# Patient Record
Sex: MALE | Race: WHITE | NOT HISPANIC OR LATINO | URBAN - METROPOLITAN AREA
[De-identification: names, ages, dates, MRNs, and addresses within clinical notes are randomized per-mention and may not be internally consistent; named-entity substitution may affect disease eponyms.]

---

## 2017-11-05 ENCOUNTER — OUTPATIENT (OUTPATIENT)
Dept: OUTPATIENT SERVICES | Facility: HOSPITAL | Age: 47
LOS: 1 days | Discharge: HOME | End: 2017-11-05

## 2017-11-06 DIAGNOSIS — G47.33 OBSTRUCTIVE SLEEP APNEA (ADULT) (PEDIATRIC): ICD-10-CM

## 2019-10-11 ENCOUNTER — APPOINTMENT (OUTPATIENT)
Dept: OTOLARYNGOLOGY | Facility: CLINIC | Age: 49
End: 2019-10-11
Payer: COMMERCIAL

## 2019-10-11 VITALS
WEIGHT: 300 LBS | SYSTOLIC BLOOD PRESSURE: 133 MMHG | BODY MASS INDEX: 38.5 KG/M2 | HEART RATE: 77 BPM | DIASTOLIC BLOOD PRESSURE: 84 MMHG | HEIGHT: 74 IN

## 2019-10-11 DIAGNOSIS — Z87.39 PERSONAL HISTORY OF OTHER DISEASES OF THE MUSCULOSKELETAL SYSTEM AND CONNECTIVE TISSUE: ICD-10-CM

## 2019-10-11 DIAGNOSIS — Z86.79 PERSONAL HISTORY OF OTHER DISEASES OF THE CIRCULATORY SYSTEM: ICD-10-CM

## 2019-10-11 DIAGNOSIS — E78.2 MIXED HYPERLIPIDEMIA: ICD-10-CM

## 2019-10-11 DIAGNOSIS — E11.9 TYPE 2 DIABETES MELLITUS W/OUT COMPLICATIONS: ICD-10-CM

## 2019-10-11 DIAGNOSIS — Z87.81 PERSONAL HISTORY OF (HEALED) TRAUMATIC FRACTURE: ICD-10-CM

## 2019-10-11 DIAGNOSIS — K57.30 DIVERTICULOSIS OF LARGE INTESTINE W/OUT PERFORATION OR ABSCESS W/OUT BLEEDING: ICD-10-CM

## 2019-10-11 PROBLEM — Z00.00 ENCOUNTER FOR PREVENTIVE HEALTH EXAMINATION: Status: ACTIVE | Noted: 2019-10-11

## 2019-10-11 PROCEDURE — 99244 OFF/OP CNSLTJ NEW/EST MOD 40: CPT | Mod: 25

## 2019-10-11 PROCEDURE — 31231 NASAL ENDOSCOPY DX: CPT

## 2019-10-11 RX ORDER — MOMETASONE 50 UG/1
50 SPRAY, METERED NASAL DAILY
Qty: 1 | Refills: 5 | Status: ACTIVE | COMMUNITY
Start: 2019-10-11 | End: 1900-01-01

## 2019-10-11 RX ORDER — ALLOPURINOL 200 MG/1
TABLET ORAL
Refills: 0 | Status: ACTIVE | COMMUNITY

## 2019-10-11 RX ORDER — ATORVASTATIN CALCIUM 80 MG/1
TABLET, FILM COATED ORAL
Refills: 0 | Status: ACTIVE | COMMUNITY

## 2019-10-11 RX ORDER — FENOFIBRATE 134 MG/1
134 CAPSULE ORAL
Refills: 0 | Status: ACTIVE | COMMUNITY

## 2019-10-11 RX ORDER — VALSARTAN 40 MG/1
TABLET, COATED ORAL
Refills: 0 | Status: ACTIVE | COMMUNITY

## 2019-10-11 RX ORDER — SAXAGLIPTIN 5 MG/1
TABLET, FILM COATED ORAL
Refills: 0 | Status: ACTIVE | COMMUNITY

## 2019-10-11 RX ORDER — ASPIRIN 81 MG
81 TABLET, DELAYED RELEASE (ENTERIC COATED) ORAL
Refills: 0 | Status: ACTIVE | COMMUNITY

## 2019-10-11 RX ORDER — PIOGLITAZONE 30 MG/1
30 TABLET ORAL
Refills: 0 | Status: ACTIVE | COMMUNITY

## 2019-10-11 RX ORDER — MULTIVITAMIN
TABLET ORAL
Refills: 0 | Status: ACTIVE | COMMUNITY

## 2019-10-11 NOTE — CONSULT LETTER
[Dear  ___] : Dear  [unfilled], [Consult Letter:] : I had the pleasure of evaluating your patient, [unfilled]. [Sincerely,] : Sincerely, [Please see my note below.] : Please see my note below. [Consult Closing:] : Thank you very much for allowing me to participate in the care of this patient.  If you have any questions, please do not hesitate to contact me. [FreeTextEntry3] : Issa Bond MD, ZEINAB, FACS\par  Department Otolaryngology\par Director of [facility]\par Professor of Otolaryngology, \par [facility]\par

## 2019-10-11 NOTE — PROCEDURE
[Image(s) Captured] : image(s) captured and filed [Topical Lidocaine] : topical lidocaine [Oxymetazoline HCl] : oxymetazoline HCl [Flexible Endoscope] : examined with the flexible endoscope [Serial Number: ___] : Serial Number: [unfilled] [Anterior rhinoscopy insufficient to account for symptoms] : anterior rhinoscopy insufficient to account for symptoms [Anatomical Abnormality] : anatomical abnormality [Recalcitrant Symptoms] : recalcitrant symptoms  [FreeTextEntry6] : Pre-op indication(s): JAMES, Nasal obstruction\par Post-op indication(s): Re deviated septum\par Verbal consent obtained from patient.\par “Anterior rhinoscopy insufficient to account for symptoms” \par Details for procedure: \par Scope #: [id number]\par Type of scope:    flexible fiber optic telescope   X  Rigid glass telescope \par Anesthesia and/or vasoconstriction was achieved topically by using: \par 4% Lidocaine spray   0.05% Oxymetazoline     Other ______ \par The following anatomic sites were directly examined in a sequential fashion: \par The scope was introduced in the nasal passage between the middle and inferior turbinates to exam the inferior portion of the middle meatus and the fontanelle, as well as the maxillary ostia. Next, the scope was passed medially and posteriorly to the middle turbinates to examine the sphenoethmoid recess and the superior turbinate region. \par Upon visualization the finders are as follows: \par Nasal Septum:      Deviated to      right    \par Bleeding site cauterized:    Anterior   left   right   Posterior   left   right \par Method:   Silver Nitrate   YAG Laser    Electrocautery ______ \par Right Side: \par * Mucosa: Normal\par * Mucous: Normal\par * Polyp: Normal\par * Inferior Turbinate: Normal\par * Middle Turbinate: Normal\par * Superior Turbinate: Normal\par * Inferior Meatus: Normal\par * Middle Meatus: Normal\par * Super Meatus: Normal\par * Sphenoethmoidal Recess: Normal\par Left Side: \par * Mucosa: Normal\par * Mucous: Normal\par * Polyp: Normal\par * Inferior Turbinate: Comp. Hypertrophy\par * Middle Turbinate: Normal\par * Superior Turbinate: Normal\par * Inferior Meatus: Normal\par * Middle Meatus: Normal\par * Super Meatus: Normal\par * Sphenoethmoidal Recess: Normal\par The patient tolerated the procedure well without any complications.\par \par \par  [de-identified] : JAMES

## 2019-10-11 NOTE — HISTORY OF PRESENT ILLNESS
[SMR] : submucous resection (SMR) [de-identified] : 49 year old male referred by Dr. Arango, for nasal congestion, difficulty breathing through the nose.  States left side of nose is more difficult to breath.  Denies sinus pain, sinus pressure, post nasal drip, nasal discharge. States difficult to use CPAP at night for obstructive sleep apnea due to breathing difficulties.  Denies sinus infections in the past year.  Denies use of steroidal nasal sprays. Pt just switched to full mask. Helps a little but still can not use long term. Keeps waking up.Patient had septum surgery in the 1980's .

## 2019-10-11 NOTE — REASON FOR VISIT
[Initial Consultation] : an initial consultation for [Other: _____] : [unfilled] [FreeTextEntry2] : referred by Dr. Arango, for nasal congestion, difficulty breathing through the nose

## 2019-10-11 NOTE — REVIEW OF SYSTEMS
[Nasal Congestion] : nasal congestion [Problem Snoring] : problem snoring [Negative] : Heme/Lymph [FreeTextEntry1] : fatigue

## 2019-11-27 ENCOUNTER — APPOINTMENT (OUTPATIENT)
Dept: OTOLARYNGOLOGY | Facility: CLINIC | Age: 49
End: 2019-11-27
Payer: COMMERCIAL

## 2019-11-27 VITALS
DIASTOLIC BLOOD PRESSURE: 85 MMHG | BODY MASS INDEX: 38.5 KG/M2 | HEIGHT: 74 IN | WEIGHT: 300 LBS | HEART RATE: 82 BPM | SYSTOLIC BLOOD PRESSURE: 129 MMHG

## 2019-11-27 DIAGNOSIS — R06.89 OTHER ABNORMALITIES OF BREATHING: ICD-10-CM

## 2019-11-27 DIAGNOSIS — J34.2 DEVIATED NASAL SEPTUM: ICD-10-CM

## 2019-11-27 DIAGNOSIS — Z78.9 OTHER SPECIFIED HEALTH STATUS: ICD-10-CM

## 2019-11-27 DIAGNOSIS — Z83.3 FAMILY HISTORY OF DIABETES MELLITUS: ICD-10-CM

## 2019-11-27 DIAGNOSIS — Z80.9 FAMILY HISTORY OF MALIGNANT NEOPLASM, UNSPECIFIED: ICD-10-CM

## 2019-11-27 DIAGNOSIS — R09.81 NASAL CONGESTION: ICD-10-CM

## 2019-11-27 PROCEDURE — 99214 OFFICE O/P EST MOD 30 MIN: CPT | Mod: 25

## 2019-11-27 PROCEDURE — 31231 NASAL ENDOSCOPY DX: CPT

## 2019-11-27 NOTE — HISTORY OF PRESENT ILLNESS
[de-identified] : 49 year old male follow up for deviated septum.  States has used the Nasonex as prescribed since last visit with minimal relief.  \par

## 2019-11-27 NOTE — PHYSICAL EXAM
[Nasal Endoscopy Performed] : nasal endoscopy was performed, see procedure section for findings [] : septum deviated to the right [Midline] : trachea located in midline position [Normal] : no rashes [de-identified] : Marked hypertrophy

## 2019-11-27 NOTE — PROCEDURE
[Image(s) Captured] : image(s) captured and filed [Topical Lidocaine] : topical lidocaine [Oxymetazoline HCl] : oxymetazoline HCl [Flexible Endoscope] : examined with the flexible endoscope [Serial Number: ___] : Serial Number: [unfilled] [Recalcitrant Symptoms] : recalcitrant symptoms  [Anatomical Abnormality] : anatomical abnormality [Anterior rhinoscopy insufficient to account for symptoms] : anterior rhinoscopy insufficient to account for symptoms [FreeTextEntry6] : Pre-op indication(s): Nasal obstruction, JAMES\par Post-op indication(s): Deviated septum\par Verbal consent obtained from patient.\par “Anterior rhinoscopy insufficient to account for symptoms” \par Details for procedure: \par Scope #: 94\par Type of scope:    flexible fiber optic telescope  X   Rigid glass telescope \par Anesthesia and/or vasoconstriction was achieved topically by using: \par 4% Lidocaine spray   0.05% Oxymetazoline     Other ______ \par The following anatomic sites were directly examined in a sequential fashion: \par The scope was introduced in the nasal passage between the middle and inferior turbinates to exam the inferior portion of the middle meatus and the fontanelle, as well as the maxillary ostia. Next, the scope was passed medially and posteriorly to the middle turbinates to examine the sphenoethmoid recess and the superior turbinate region. \par Upon visualization the finders are as follows: \par Nasal Septum:   Normal    Deviated to   left  X  right   Spur left   right   Perforation    Crust \par Bleeding site cauterized:    Anterior   left   right   Posterior   left   right \par Method:   Silver Nitrate   YAG Laser    Electrocautery ______ \par Right Side: \par * Mucosa: Normal\par * Mucous: Normal\par * Polyp: Normal\par * Inferior Turbinate: Normal\par * Middle Turbinate: Normal\par * Superior Turbinate: Normal\par * Inferior Meatus: Normal\par * Middle Meatus: Normal\par * Super Meatus: Normal\par * Sphenoethmoidal Recess: Normal\par Left Side: \par * Mucosa: Normal\par * Mucous: Normal\par * Polyp: Normal\par * Inferior Turbinate: enlarged\par * Middle Turbinate: Normal\par * Superior Turbinate: Normal\par * Inferior Meatus: Normal\par * Middle Meatus: Normal\par * Super Meatus: Normal\par * Sphenoethmoidal Recess: Normal\par The patient tolerated the procedure well without any complications.\par \par \par

## 2019-11-27 NOTE — REASON FOR VISIT
[Subsequent Evaluation] : a subsequent evaluation for [Other: _____] : [unfilled] [FreeTextEntry2] : follow up for deviated septum

## 2019-12-24 NOTE — END OF VISIT
Anesthesia Type: 1% lidocaine with 1:100,000 epinephrine and 408mcg clindamycin/ml and a 1:10 solution of 8.4% sodium bicarbonate Hide Anesthesia Volume?: No [>50% of Time Spent on Counseling for ____] : Greater than 50% of the encounter time was spent on counseling for [unfilled] Additional Anesthesia Volume In Cc (Will Not Render If 0): 0 [Time Spent: ___ minutes] : I have spent [unfilled] minutes of face to face time with the patient Cryotherapy Text: The wound bed was treated with cryotherapy after the biopsy was performed. Lab Facility:  Lab: [id number] Post-Care Instructions: I reviewed with the patient in detail post-care instructions. Patient is to keep the biopsy site dry overnight, and then apply bacitracin twice daily until healed. Patient may apply hydrogen peroxide soaks to remove any crusting. Electrodesiccation Text: The wound bed was treated with electrodesiccation after the biopsy was performed. Depth Of Biopsy: dermis Consent: Written consent was obtained and risks were reviewed including but not limited to scarring, infection, bleeding, scabbing, incomplete removal, nerve damage and allergy to anesthesia. Notification Instructions: Patient will be notified of biopsy results. However, patient instructed to call the office if not contacted within 2 weeks. Wound Care: Aquaphor Render Post-Care Instructions In Note?: yes Biopsy Type: H and E Anesthesia Volume In Cc: 1.5 Type Of Destruction Used: Curettage Electrodesiccation And Curettage Text: The wound bed was treated with electrodesiccation and curettage after the biopsy was performed. Billing Type: Third-Party Bill Detail Level: Detailed Biopsy Method: Personna blade Hemostasis: Aluminum Chloride and Electrocautery Curettage Text: The wound bed was treated with curettage after the biopsy was performed. Dressing: bandage Silver Nitrate Text: The wound bed was treated with silver nitrate after the biopsy was performed.

## 2020-02-21 ENCOUNTER — OUTPATIENT (OUTPATIENT)
Dept: OUTPATIENT SERVICES | Facility: HOSPITAL | Age: 50
LOS: 1 days | End: 2020-02-21
Payer: COMMERCIAL

## 2020-02-21 VITALS
OXYGEN SATURATION: 98 % | TEMPERATURE: 97 F | DIASTOLIC BLOOD PRESSURE: 78 MMHG | HEART RATE: 78 BPM | SYSTOLIC BLOOD PRESSURE: 126 MMHG | RESPIRATION RATE: 16 BRPM | HEIGHT: 74 IN | WEIGHT: 304.02 LBS

## 2020-02-21 DIAGNOSIS — Z87.81 PERSONAL HISTORY OF (HEALED) TRAUMATIC FRACTURE: Chronic | ICD-10-CM

## 2020-02-21 DIAGNOSIS — Z87.710 PERSONAL HISTORY OF (CORRECTED) HYPOSPADIAS: Chronic | ICD-10-CM

## 2020-02-21 DIAGNOSIS — J34.2 DEVIATED NASAL SEPTUM: ICD-10-CM

## 2020-02-21 DIAGNOSIS — Z98.890 OTHER SPECIFIED POSTPROCEDURAL STATES: Chronic | ICD-10-CM

## 2020-02-21 LAB
ALBUMIN SERPL ELPH-MCNC: 4.3 G/DL — SIGNIFICANT CHANGE UP (ref 3.3–5)
ALP SERPL-CCNC: 51 U/L — SIGNIFICANT CHANGE UP (ref 40–120)
ALT FLD-CCNC: 26 U/L — SIGNIFICANT CHANGE UP (ref 4–41)
ANION GAP SERPL CALC-SCNC: 16 MMO/L — HIGH (ref 7–14)
AST SERPL-CCNC: 22 U/L — SIGNIFICANT CHANGE UP (ref 4–40)
BILIRUB SERPL-MCNC: 0.4 MG/DL — SIGNIFICANT CHANGE UP (ref 0.2–1.2)
BUN SERPL-MCNC: 19 MG/DL — SIGNIFICANT CHANGE UP (ref 7–23)
CALCIUM SERPL-MCNC: 10 MG/DL — SIGNIFICANT CHANGE UP (ref 8.4–10.5)
CHLORIDE SERPL-SCNC: 99 MMOL/L — SIGNIFICANT CHANGE UP (ref 98–107)
CO2 SERPL-SCNC: 24 MMOL/L — SIGNIFICANT CHANGE UP (ref 22–31)
CREAT SERPL-MCNC: 1.21 MG/DL — SIGNIFICANT CHANGE UP (ref 0.5–1.3)
GLUCOSE SERPL-MCNC: 259 MG/DL — HIGH (ref 70–99)
HCT VFR BLD CALC: 48.3 % — SIGNIFICANT CHANGE UP (ref 39–50)
HGB BLD-MCNC: 15.8 G/DL — SIGNIFICANT CHANGE UP (ref 13–17)
MCHC RBC-ENTMCNC: 28.5 PG — SIGNIFICANT CHANGE UP (ref 27–34)
MCHC RBC-ENTMCNC: 32.7 % — SIGNIFICANT CHANGE UP (ref 32–36)
MCV RBC AUTO: 87.2 FL — SIGNIFICANT CHANGE UP (ref 80–100)
NRBC # FLD: 0 K/UL — SIGNIFICANT CHANGE UP (ref 0–0)
PLATELET # BLD AUTO: 290 K/UL — SIGNIFICANT CHANGE UP (ref 150–400)
PMV BLD: 10.8 FL — SIGNIFICANT CHANGE UP (ref 7–13)
POTASSIUM SERPL-MCNC: 4 MMOL/L — SIGNIFICANT CHANGE UP (ref 3.5–5.3)
POTASSIUM SERPL-SCNC: 4 MMOL/L — SIGNIFICANT CHANGE UP (ref 3.5–5.3)
PROT SERPL-MCNC: 7.2 G/DL — SIGNIFICANT CHANGE UP (ref 6–8.3)
RBC # BLD: 5.54 M/UL — SIGNIFICANT CHANGE UP (ref 4.2–5.8)
RBC # FLD: 13.9 % — SIGNIFICANT CHANGE UP (ref 10.3–14.5)
SODIUM SERPL-SCNC: 139 MMOL/L — SIGNIFICANT CHANGE UP (ref 135–145)
WBC # BLD: 9.7 K/UL — SIGNIFICANT CHANGE UP (ref 3.8–10.5)
WBC # FLD AUTO: 9.7 K/UL — SIGNIFICANT CHANGE UP (ref 3.8–10.5)

## 2020-02-21 PROCEDURE — 93010 ELECTROCARDIOGRAM REPORT: CPT

## 2020-02-21 RX ORDER — SODIUM CHLORIDE 9 MG/ML
1000 INJECTION, SOLUTION INTRAVENOUS
Refills: 0 | Status: DISCONTINUED | OUTPATIENT
Start: 2020-02-27 | End: 2020-02-28

## 2020-02-21 RX ORDER — MOMETASONE FUROATE 50 UG/1
2 SPRAY NASAL
Qty: 0 | Refills: 0 | DISCHARGE

## 2020-02-21 NOTE — H&P PST ADULT - NSICDXPASTMEDICALHX_GEN_ALL_CORE_FT
PAST MEDICAL HISTORY:  History of cardiac murmur as a child     HTN (hypertension)     Hyperlipidemia     Immature cataract     Type 2 diabetes mellitus PAST MEDICAL HISTORY:  History of cardiac murmur as a child     HTN (hypertension)     Hyperlipidemia     Immature cataract     JAMES on CPAP     Type 2 diabetes mellitus

## 2020-02-21 NOTE — H&P PST ADULT - NSICDXPASTSURGICALHX_GEN_ALL_CORE_FT
PAST SURGICAL HISTORY:  H/O fracture of nose 1986    H/O hypospadias x3-1970's    S/P correction of deviated nasal septum 1980's

## 2020-02-21 NOTE — H&P PST ADULT - NSICDXPROBLEM_GEN_ALL_CORE_FT
PROBLEM DIAGNOSES  Problem: Deviated septum  Assessment and Plan: Pt. is scheduled for a septoplasty, turbinectomy 2/21/20.  OR booking notified of sleep apnea.  Pt. has a new PMD and was last seen 12/2019. PROBLEM DIAGNOSES  Problem: Deviated septum  Assessment and Plan: Pt. is scheduled for a septoplasty, turbinectomy 2/21/20.  Pt. instructed to take last dose of Pioglitazone and Tradjenta in the morning the day before surgery.  OR booking notified of sleep apnea.  Pt. has a new PMD and was first and last seen 12/2019.

## 2020-02-26 ENCOUNTER — TRANSCRIPTION ENCOUNTER (OUTPATIENT)
Age: 50
End: 2020-02-26

## 2020-02-26 NOTE — ASU PATIENT PROFILE, ADULT - VISION (WITH CORRECTIVE LENSES IF THE PATIENT USUALLY WEARS THEM):
"glasses, bifocals"/Partially impaired: cannot see medication labels or newsprint, but can see obstacles in path, and the surrounding layout; can count fingers at arm's length

## 2020-02-26 NOTE — ASU PATIENT PROFILE, ADULT - PSH
H/O fracture of nose  1986  H/O hypospadias  x3-1970's  S/P correction of deviated nasal septum  1980's

## 2020-02-26 NOTE — ASU PATIENT PROFILE, ADULT - PMH
History of cardiac murmur as a child    HTN (hypertension)    Hyperlipidemia    Immature cataract    JAMES on CPAP    Type 2 diabetes mellitus

## 2020-02-27 ENCOUNTER — APPOINTMENT (OUTPATIENT)
Dept: OTOLARYNGOLOGY | Facility: HOSPITAL | Age: 50
End: 2020-02-27

## 2020-02-27 ENCOUNTER — RESULT REVIEW (OUTPATIENT)
Age: 50
End: 2020-02-27

## 2020-02-27 ENCOUNTER — INPATIENT (INPATIENT)
Facility: HOSPITAL | Age: 50
LOS: 0 days | Discharge: HOME | End: 2020-02-28
Attending: OTOLARYNGOLOGY | Admitting: OTOLARYNGOLOGY
Payer: COMMERCIAL

## 2020-02-27 VITALS
HEIGHT: 74 IN | WEIGHT: 304.02 LBS | DIASTOLIC BLOOD PRESSURE: 92 MMHG | OXYGEN SATURATION: 95 % | SYSTOLIC BLOOD PRESSURE: 157 MMHG | RESPIRATION RATE: 19 BRPM | TEMPERATURE: 98 F | HEART RATE: 84 BPM

## 2020-02-27 DIAGNOSIS — Z87.81 PERSONAL HISTORY OF (HEALED) TRAUMATIC FRACTURE: Chronic | ICD-10-CM

## 2020-02-27 DIAGNOSIS — R07.0 PAIN IN THROAT: ICD-10-CM

## 2020-02-27 DIAGNOSIS — J34.2 DEVIATED NASAL SEPTUM: ICD-10-CM

## 2020-02-27 DIAGNOSIS — Z87.710 PERSONAL HISTORY OF (CORRECTED) HYPOSPADIAS: Chronic | ICD-10-CM

## 2020-02-27 DIAGNOSIS — Z98.890 OTHER SPECIFIED POSTPROCEDURAL STATES: Chronic | ICD-10-CM

## 2020-02-27 LAB — GLUCOSE BLDC GLUCOMTR-MCNC: 139 MG/DL — HIGH (ref 70–99)

## 2020-02-27 PROCEDURE — 30930 THER FX NASAL INF TURBINATE: CPT | Mod: 50,59

## 2020-02-27 PROCEDURE — 30520 REPAIR OF NASAL SEPTUM: CPT

## 2020-02-27 PROCEDURE — 30130 EXCISE INFERIOR TURBINATE: CPT | Mod: 77

## 2020-02-27 PROCEDURE — 88304 TISSUE EXAM BY PATHOLOGIST: CPT | Mod: 26

## 2020-02-27 RX ORDER — ATORVASTATIN CALCIUM 80 MG/1
80 TABLET, FILM COATED ORAL AT BEDTIME
Refills: 0 | Status: DISCONTINUED | OUTPATIENT
Start: 2020-02-27 | End: 2020-02-28

## 2020-02-27 RX ORDER — ALLOPURINOL 300 MG
300 TABLET ORAL DAILY
Refills: 0 | Status: DISCONTINUED | OUTPATIENT
Start: 2020-02-27 | End: 2020-02-28

## 2020-02-27 RX ORDER — ASPIRIN/CALCIUM CARB/MAGNESIUM 324 MG
81 TABLET ORAL DAILY
Refills: 0 | Status: DISCONTINUED | OUTPATIENT
Start: 2020-02-27 | End: 2020-02-28

## 2020-02-27 RX ORDER — OXYCODONE HYDROCHLORIDE 5 MG/1
5 TABLET ORAL EVERY 6 HOURS
Refills: 0 | Status: DISCONTINUED | OUTPATIENT
Start: 2020-02-27 | End: 2020-02-28

## 2020-02-27 RX ORDER — GLUCOSAMINE/MSM/CHONDROITIN A 750-375MG
1 TABLET ORAL
Qty: 0 | Refills: 0 | DISCHARGE

## 2020-02-27 RX ORDER — ATORVASTATIN CALCIUM 80 MG/1
1 TABLET, FILM COATED ORAL
Qty: 0 | Refills: 0 | DISCHARGE

## 2020-02-27 RX ORDER — MOMETASONE FUROATE 50 UG/1
2 SPRAY NASAL
Qty: 0 | Refills: 0 | DISCHARGE

## 2020-02-27 RX ORDER — HYDROMORPHONE HYDROCHLORIDE 2 MG/ML
0.5 INJECTION INTRAMUSCULAR; INTRAVENOUS; SUBCUTANEOUS
Refills: 0 | Status: DISCONTINUED | OUTPATIENT
Start: 2020-02-27 | End: 2020-02-28

## 2020-02-27 RX ORDER — VALSARTAN 80 MG/1
1 TABLET ORAL
Qty: 0 | Refills: 0 | DISCHARGE

## 2020-02-27 RX ORDER — OXYCODONE HYDROCHLORIDE 5 MG/1
10 TABLET ORAL EVERY 6 HOURS
Refills: 0 | Status: DISCONTINUED | OUTPATIENT
Start: 2020-02-27 | End: 2020-02-28

## 2020-02-27 RX ORDER — ALLOPURINOL 300 MG
1 TABLET ORAL
Qty: 0 | Refills: 0 | DISCHARGE

## 2020-02-27 RX ORDER — FENOFIBRIC ACID 105 MG/1
1 TABLET ORAL
Qty: 0 | Refills: 0 | DISCHARGE

## 2020-02-27 RX ORDER — LOSARTAN POTASSIUM 100 MG/1
50 TABLET, FILM COATED ORAL DAILY
Refills: 0 | Status: DISCONTINUED | OUTPATIENT
Start: 2020-02-27 | End: 2020-02-27

## 2020-02-27 RX ORDER — ONDANSETRON 8 MG/1
8 TABLET, FILM COATED ORAL ONCE
Refills: 0 | Status: COMPLETED | OUTPATIENT
Start: 2020-02-27 | End: 2020-02-28

## 2020-02-27 RX ORDER — ACETAMINOPHEN 500 MG
650 TABLET ORAL EVERY 6 HOURS
Refills: 0 | Status: DISCONTINUED | OUTPATIENT
Start: 2020-02-27 | End: 2020-02-28

## 2020-02-27 RX ORDER — LOSARTAN POTASSIUM 100 MG/1
50 TABLET, FILM COATED ORAL DAILY
Refills: 0 | Status: DISCONTINUED | OUTPATIENT
Start: 2020-02-27 | End: 2020-02-28

## 2020-02-27 RX ORDER — CEPHALEXIN 500 MG
500 CAPSULE ORAL EVERY 12 HOURS
Refills: 0 | Status: DISCONTINUED | OUTPATIENT
Start: 2020-02-27 | End: 2020-02-28

## 2020-02-27 RX ORDER — ASPIRIN/CALCIUM CARB/MAGNESIUM 324 MG
1 TABLET ORAL
Qty: 0 | Refills: 0 | DISCHARGE

## 2020-02-27 RX ORDER — HYDROMORPHONE HYDROCHLORIDE 2 MG/ML
1 INJECTION INTRAMUSCULAR; INTRAVENOUS; SUBCUTANEOUS
Refills: 0 | Status: DISCONTINUED | OUTPATIENT
Start: 2020-02-27 | End: 2020-02-28

## 2020-02-27 RX ORDER — LINAGLIPTIN 5 MG/1
1 TABLET, FILM COATED ORAL
Qty: 0 | Refills: 0 | DISCHARGE

## 2020-02-27 RX ORDER — PIOGLITAZONE HYDROCHLORIDE 15 MG/1
1 TABLET ORAL
Qty: 0 | Refills: 0 | DISCHARGE

## 2020-02-27 RX ADMIN — SODIUM CHLORIDE 30 MILLILITER(S): 9 INJECTION, SOLUTION INTRAVENOUS at 16:07

## 2020-02-27 NOTE — ASU PREOP CHECKLIST - IDENTIFICATION BAND VERIFIED
This is a recent snapshot of the patient's [facility] medical record.  For current drug dose and complete information and questions, call [phone] or In Basket pool, [facility] home infusion ([id number])  CSN Number:  [id number]       done

## 2020-02-28 VITALS — HEART RATE: 78 BPM | OXYGEN SATURATION: 100 % | RESPIRATION RATE: 20 BRPM

## 2020-02-28 DIAGNOSIS — Z09 ENCOUNTER FOR FOLLOW-UP EXAMINATION AFTER COMPLETED TREATMENT FOR CONDITIONS OTHER THAN MALIGNANT NEOPLASM: ICD-10-CM

## 2020-02-28 PROBLEM — H26.9 UNSPECIFIED CATARACT: Chronic | Status: ACTIVE | Noted: 2020-02-21

## 2020-02-28 PROBLEM — E78.5 HYPERLIPIDEMIA, UNSPECIFIED: Chronic | Status: ACTIVE | Noted: 2020-02-21

## 2020-02-28 PROBLEM — G47.33 OBSTRUCTIVE SLEEP APNEA (ADULT) (PEDIATRIC): Chronic | Status: ACTIVE | Noted: 2020-02-21

## 2020-02-28 PROBLEM — Z87.898 PERSONAL HISTORY OF OTHER SPECIFIED CONDITIONS: Chronic | Status: ACTIVE | Noted: 2020-02-21

## 2020-02-28 PROBLEM — E11.9 TYPE 2 DIABETES MELLITUS WITHOUT COMPLICATIONS: Chronic | Status: ACTIVE | Noted: 2020-02-21

## 2020-02-28 PROBLEM — I10 ESSENTIAL (PRIMARY) HYPERTENSION: Chronic | Status: ACTIVE | Noted: 2020-02-21

## 2020-02-28 RX ORDER — OXYCODONE AND ACETAMINOPHEN 5; 325 MG/1; MG/1
5-325 TABLET ORAL
Qty: 10 | Refills: 0 | Status: ACTIVE | COMMUNITY
Start: 2020-02-28 | End: 1900-01-01

## 2020-02-28 RX ORDER — CEPHALEXIN 500 MG
1 CAPSULE ORAL
Qty: 14 | Refills: 0
Start: 2020-02-28 | End: 2020-03-05

## 2020-02-28 RX ORDER — SODIUM CHLORIDE 0.65 %
2 AEROSOL, SPRAY (ML) NASAL
Qty: 56 | Refills: 0
Start: 2020-02-28 | End: 2020-03-05

## 2020-02-28 RX ADMIN — Medication 650 MILLIGRAM(S): at 06:30

## 2020-02-28 RX ADMIN — Medication 650 MILLIGRAM(S): at 07:01

## 2020-02-28 RX ADMIN — ONDANSETRON 8 MILLIGRAM(S): 8 TABLET, FILM COATED ORAL at 00:25

## 2020-02-28 RX ADMIN — OXYCODONE HYDROCHLORIDE 10 MILLIGRAM(S): 5 TABLET ORAL at 02:05

## 2020-02-28 RX ADMIN — LOSARTAN POTASSIUM 50 MILLIGRAM(S): 100 TABLET, FILM COATED ORAL at 06:35

## 2020-02-28 RX ADMIN — Medication 500 MILLIGRAM(S): at 06:35

## 2020-02-28 RX ADMIN — OXYCODONE HYDROCHLORIDE 10 MILLIGRAM(S): 5 TABLET ORAL at 02:35

## 2020-02-28 NOTE — ASU DISCHARGE PLAN (ADULT/PEDIATRIC) - NURSING INSTRUCTIONS
No strenuous exerciseing, aerobics or lifting for two weeks. Liquid and soft diet for the first 24 hours. Resume regular diet as tolerated. Nothing hot in temperature to eat or drink, avoid hot baths.  No nose blowing - sneeze with mouth open. Apply ice to reduce pain and swelling.   When taking pain meds - take with food and know it may cause constipation and nausea - Do NOT drive while on narcotics.

## 2020-02-28 NOTE — ASU DISCHARGE PLAN (ADULT/PEDIATRIC) - ASU DC SPECIAL INSTRUCTIONSFT
Start nasal saline spray and keflex as indicated  Percocet for severe pain, OTC tylenols 650mg PO Q6 hours for mild to moderate pain  Do not blow nose  Please make an appointment to follow up with Dr. Bond in a week

## 2020-02-28 NOTE — ASU DISCHARGE PLAN (ADULT/PEDIATRIC) - CARE PROVIDER_API CALL
Issa Bond; ZEINAB)  Otolaryngology  [address] [place] [id number]  Phone: [phone]  Fax: [phone]  Established Patient  Follow Up Time: 1 week

## 2020-03-03 PROBLEM — R07.0 THROAT PAIN IN ADULT: Status: ACTIVE | Noted: 2020-03-03

## 2020-03-03 RX ORDER — METHYLPREDNISOLONE 4 MG/1
4 TABLET ORAL
Qty: 1 | Refills: 1 | Status: ACTIVE | COMMUNITY
Start: 2020-03-03 | End: 1900-01-01

## 2020-03-06 ENCOUNTER — APPOINTMENT (OUTPATIENT)
Dept: OTOLARYNGOLOGY | Facility: CLINIC | Age: 50
End: 2020-03-06
Payer: COMMERCIAL

## 2020-03-06 PROCEDURE — 99024 POSTOP FOLLOW-UP VISIT: CPT

## 2020-03-06 NOTE — REASON FOR VISIT
[Post-Operative Visit] : a post-operative visit [FreeTextEntry2] : splints removal [FreeTextEntry1] : s/p septoplasty

## 2020-03-06 NOTE — PHYSICAL EXAM
[Normal] : no abnormal secretions [de-identified] : Septum is midline [de-identified] : serous

## 2020-03-06 NOTE — PROCEDURE
[FreeTextEntry3] : Nose sprayed with Lidocaine and Afrin. Nose suctioned and crusts debrided. Nasal splint and sutures removed. Pt can breathe well.

## 2020-03-06 NOTE — HISTORY OF PRESENT ILLNESS
[de-identified] : 49 year old male, Pt is healing well. Pt admits to using saline diligently throughout the week. Pt has moderate nasal congestion and mild pain.\par \par

## 2020-03-07 ENCOUNTER — TRANSCRIPTION ENCOUNTER (OUTPATIENT)
Age: 50
End: 2020-03-07

## 2020-06-16 ENCOUNTER — TRANSCRIPTION ENCOUNTER (OUTPATIENT)
Age: 50
End: 2020-06-16

## 2021-02-28 DIAGNOSIS — I10 ESSENTIAL (PRIMARY) HYPERTENSION: ICD-10-CM

## 2021-02-28 DIAGNOSIS — E78.00 PURE HYPERCHOLESTEROLEMIA, UNSPECIFIED: ICD-10-CM

## 2021-02-28 DIAGNOSIS — Z82.49 FAMILY HISTORY OF ISCHEMIC HEART DISEASE AND OTHER DISEASES OF THE CIRCULATORY SYSTEM: ICD-10-CM

## 2021-02-28 DIAGNOSIS — Z80.7 FAMILY HISTORY OF OTHER MALIGNANT NEOPLASMS OF LYMPHOID, HEMATOPOIETIC AND RELATED TISSUES: ICD-10-CM

## 2021-02-28 DIAGNOSIS — M10.9 GOUT, UNSPECIFIED: ICD-10-CM

## 2021-02-28 RX ORDER — PIOGLITAZONE HYDROCHLORIDE 30 MG/1
30 TABLET ORAL
Refills: 0 | Status: ACTIVE | COMMUNITY

## 2021-02-28 RX ORDER — FENOFIBRIC ACID 135 MG/1
135 CAPSULE, DELAYED RELEASE ORAL
Refills: 0 | Status: ACTIVE | COMMUNITY

## 2021-02-28 RX ORDER — NAPROXEN 500 MG/1
500 TABLET ORAL
Refills: 0 | Status: ACTIVE | COMMUNITY

## 2021-03-13 ENCOUNTER — APPOINTMENT (OUTPATIENT)
Dept: PULMONOLOGY | Facility: CLINIC | Age: 51
End: 2021-03-13
Payer: COMMERCIAL

## 2021-03-13 VITALS
BODY MASS INDEX: 40.96 KG/M2 | WEIGHT: 315 LBS | HEART RATE: 85 BPM | SYSTOLIC BLOOD PRESSURE: 140 MMHG | DIASTOLIC BLOOD PRESSURE: 70 MMHG | OXYGEN SATURATION: 97 % | RESPIRATION RATE: 12 BRPM

## 2021-03-13 PROCEDURE — 99213 OFFICE O/P EST LOW 20 MIN: CPT

## 2021-03-13 PROCEDURE — 99072 ADDL SUPL MATRL&STAF TM PHE: CPT

## 2021-03-13 NOTE — HISTORY OF PRESENT ILLNESS
[Follow-Up - Routine Clinic] : a routine clinic follow-up of [None] : No associated symptoms are reported [CPAP] : CPAP [Good Compliance] : good compliance with treatment [Good Tolerance] : good tolerance of treatment [Good Symptom Control] : good symptom control

## 2021-03-13 NOTE — ASSESSMENT
[FreeTextEntry1] : PLAN:\par New supplies\par Continue the use of CPAP\par Weight loss\par F/U in 12 months\par The patient is benefitting from the CPAP\par Stress the need maintain compliance  with the CPAP.\par

## 2021-06-28 NOTE — PATIENT PROFILE ADULT - HOME ACCESSIBILITY CONCERNS
[facility]        NAME: Mesha Martinez is a 37 y o  male  : 1981    MRN: [id number]  DATE: 2021  TIME: 9:03 AM    Assessment and Plan   Pain, dental [K08 89]  1  Pain, dental  amoxicillin (AMOXIL) 500 mg capsule         Patient Instructions     Take amoxicillin as prescribed  Follow up with dentist  Salt water gargles  Ice over area  Ibuprofen 600-800mg + Tylenol 1000mg every 6 hours  Do not exceed this amount  Follow up with PCP in 3-5 days  Proceed to  ER if symptoms worsen  Chief Complaint     Chief Complaint   Patient presents with    Dental Pain     Left lower side X a couple of days  History of Present Illness       Last dentist apt 1-2 months ago  Dental Pain   This is a new problem  The current episode started in the past 7 days  The problem has been gradually worsening  The pain is moderate  Associated symptoms include facial pain and thermal sensitivity  Pertinent negatives include no difficulty swallowing, fever or sinus pressure  He has tried acetaminophen for the symptoms  Review of Systems   Review of Systems   Constitutional: Negative for activity change, appetite change, chills and fever  HENT: Positive for dental problem  Negative for drooling, sinus pressure, sinus pain and sore throat  Skin: Negative for color change  Neurological: Negative for headaches  Current Medications       Current Outpatient Medications:     mirtazapine (REMERON) 15 mg tablet, Take 7 5 mg by mouth daily at bedtime , Disp: , Rfl:     QUEtiapine (SEROquel) 50 mg tablet, Take 50 mg by mouth daily at bedtime Pt is to take 25 mg in the am and 100 mg hs, Disp: , Rfl:     amoxicillin (AMOXIL) 500 mg capsule, Take 1 capsule (500 mg total) by mouth every 8 (eight) hours for 7 days    Take 2 capsules for first dose , Disp: 22 capsule, Rfl: 0    ondansetron (ZOFRAN-ODT) 4 mg disintegrating tablet, Take 1 tablet (4 mg total) by mouth every 6 (six) hours as needed for nausea or vomiting (Patient not taking: Reported on 1/14/2021), Disp: 10 tablet, Rfl: 0    oxyCODONE-acetaminophen (PERCOCET) 5-325 mg per tablet, Take 1 tablet by mouth every 4 (four) hours as needed for moderate pain or severe pain for up to 8 dosesMax Daily Amount: 6 tablets (Patient not taking: Reported on 6/4/2021), Disp: 8 tablet, Rfl: 0    patient supplied medication, medical marijuana (Patient not taking: Reported on 6/28/2021), Disp: , Rfl:     Current Allergies     Allergies as of 06/28/2021    (No Known Allergies)            The following portions of the patient's history were reviewed and updated as appropriate: allergies, current medications, past family history, past medical history, past social history, past surgical history and problem list      Past Medical History:   Diagnosis Date    Anxiety     Bipolar disorder ([facility] [address] )        Past Surgical History:   Procedure Laterality Date    HERNIA REPAIR      MOLE EXCISION Left     MYRINGOTOMY W/ TUBES  1980    WA LAP,CHOLECYSTECTOMY N/A 5/24/2021    Procedure: CHOLECYSTECTOMY LAPAROSCOPIC;  Surgeon: Camden Almaguer MD;  Location: [facility] MAIN OR;  Service: General       Family History   Problem Relation Age of Onset    Aneurysm Mother     Arthritis Father          Medications have been verified  Objective   /59   Pulse 76   Temp 98 3 °F (36 8 °C) (Temporal)   Resp 18   SpO2 98%   No LMP for male patient  Physical Exam     Physical Exam  Vitals reviewed  Constitutional:       General: He is not in acute distress  Appearance: He is well-developed  HENT:      Head: Normocephalic and atraumatic  Right Ear: External ear normal       Left Ear: External ear normal       Mouth/Throat:      Mouth: Mucous membranes are moist         Comments: Overall poor dentition  Eyes:      Conjunctiva/sclera: Conjunctivae normal    Cardiovascular:      Rate and Rhythm: Normal rate and regular rhythm        Heart sounds: Normal heart sounds  No murmur heard  No friction rub  No gallop  Pulmonary:      Effort: Pulmonary effort is normal  No respiratory distress  Breath sounds: Normal breath sounds  No wheezing or rales  Chest:      Chest wall: No tenderness  Lymphadenopathy:      Cervical: No cervical adenopathy  Skin:     General: Skin is warm  Neurological:      Mental Status: He is alert and oriented to person, place, and time  Psychiatric:         Behavior: Behavior normal          Thought Content:  Thought content normal          Judgment: Judgment normal  none

## 2023-06-11 NOTE — H&P PST ADULT - NSICDXFAMILYHX_GEN_ALL_CORE_FT
complains of pain/discomfort
FAMILY HISTORY:  FH: multiple myeloma, father  FH: Parkinson's disease, grandmother  FH: renal failure, mother

## 2024-04-19 ENCOUNTER — APPOINTMENT (OUTPATIENT)
Dept: PULMONOLOGY | Facility: CLINIC | Age: 54
End: 2024-04-19
Payer: COMMERCIAL

## 2024-04-19 VITALS
HEART RATE: 70 BPM | WEIGHT: 315 LBS | OXYGEN SATURATION: 99 % | SYSTOLIC BLOOD PRESSURE: 132 MMHG | BODY MASS INDEX: 41.86 KG/M2 | DIASTOLIC BLOOD PRESSURE: 70 MMHG

## 2024-04-19 DIAGNOSIS — E66.9 OBESITY, UNSPECIFIED: ICD-10-CM

## 2024-04-19 DIAGNOSIS — G47.30 HYPERSOMNIA, UNSPECIFIED: ICD-10-CM

## 2024-04-19 DIAGNOSIS — G47.10 HYPERSOMNIA, UNSPECIFIED: ICD-10-CM

## 2024-04-19 DIAGNOSIS — G47.33 OBSTRUCTIVE SLEEP APNEA (ADULT) (PEDIATRIC): ICD-10-CM

## 2024-04-19 PROCEDURE — 99203 OFFICE O/P NEW LOW 30 MIN: CPT

## 2024-04-19 PROCEDURE — G2211 COMPLEX E/M VISIT ADD ON: CPT

## 2024-05-01 NOTE — H&P PST ADULT - SPO2 (%)
May 1, 2024        Ellie Montez   [place] [address]        Kulwinder Argueta,    I am checking in to see how you are doing since we last spoke on 4/5/2024.    As your Care Management Nurse, I work with your doctors to offer support to improve your health. I’m here to help you reach your personal goals for healthy living.    I have been unable to reach you by phone. Please call me as soon as possible to let me know how you are doing. You can reach me at    [phone]  8:00 AM to 4:00 PM CST (Monday-Friday)     If I am not available, you can leave a message on my confidential voicemail. I will return your call within one business day.    We want to help you live well and look forward to hearing from you!    Sincerely,    Stephanie Vazquez, RN  [facility]          
[phone]

## 2024-09-30 ENCOUNTER — APPOINTMENT (OUTPATIENT)
Dept: PULMONOLOGY | Facility: CLINIC | Age: 54
End: 2024-09-30
Payer: COMMERCIAL

## 2024-09-30 VITALS
HEART RATE: 83 BPM | WEIGHT: 315 LBS | HEIGHT: 74 IN | SYSTOLIC BLOOD PRESSURE: 122 MMHG | OXYGEN SATURATION: 97 % | DIASTOLIC BLOOD PRESSURE: 84 MMHG | BODY MASS INDEX: 40.43 KG/M2

## 2024-09-30 DIAGNOSIS — G47.33 OBSTRUCTIVE SLEEP APNEA (ADULT) (PEDIATRIC): ICD-10-CM

## 2024-09-30 DIAGNOSIS — E66.9 OBESITY, UNSPECIFIED: ICD-10-CM

## 2024-09-30 PROCEDURE — 99213 OFFICE O/P EST LOW 20 MIN: CPT

## 2024-09-30 PROCEDURE — G2211 COMPLEX E/M VISIT ADD ON: CPT | Mod: NC

## 2024-09-30 NOTE — HISTORY OF PRESENT ILLNESS
[TextBox_4] : Subjective: - Summary : Patient reports issues about dryness in her CPAP machine, with the water levels going down frequently. The patient suspects an air leak.  He reported having issues with the company that provides her machine, struggled with getting a replacement water reservoir. She also mentioned feeling tired and occasional throat discomfort every morning.  The patient is however very compliant with the machine and is benefiting greatly - Chief Complaint (CC) : Issues with CPAP machine causing discomfort and tiredness - History of Present Illness (HPI) : Patient has been experiencing issues with the CPAP machine.  He has reported air leaks and difficulty adjusting to her device, resulting in morning tiredness and dry throat.  He also associated the decrease of water level with the possible leakage, her attempts to replace the water reservoir had been unsuccessful.

## 2024-09-30 NOTE — ASSESSMENT
[FreeTextEntry1] : - Summary : The problems seem to be associated with the use of the CPAP machine, with possible air leak causing discomfort and tiredness. - Problems : - Possible leaks in CPAP machine  - Sleep discomfort and tiredness  - Differential Diagnosis : - Faulty CPAP Machine  - Improper usage or fit of the CPAP machine all new supplies

## 2024-11-19 ENCOUNTER — OUTPATIENT (OUTPATIENT)
Dept: OUTPATIENT SERVICES | Facility: HOSPITAL | Age: 54
LOS: 1 days | Discharge: HOME | End: 2024-11-19

## 2024-11-19 ENCOUNTER — TRANSCRIPTION ENCOUNTER (OUTPATIENT)
Age: 54
End: 2024-11-19

## 2024-11-19 VITALS
TEMPERATURE: 98 F | SYSTOLIC BLOOD PRESSURE: 149 MMHG | DIASTOLIC BLOOD PRESSURE: 62 MMHG | RESPIRATION RATE: 18 BRPM | WEIGHT: 300.05 LBS | OXYGEN SATURATION: 98 % | HEIGHT: 74 IN | HEART RATE: 72 BPM

## 2024-11-19 VITALS
RESPIRATION RATE: 18 BRPM | HEART RATE: 72 BPM | DIASTOLIC BLOOD PRESSURE: 61 MMHG | SYSTOLIC BLOOD PRESSURE: 129 MMHG | OXYGEN SATURATION: 96 %

## 2024-11-19 DIAGNOSIS — Z98.890 OTHER SPECIFIED POSTPROCEDURAL STATES: Chronic | ICD-10-CM

## 2024-11-19 DIAGNOSIS — G47.33 OBSTRUCTIVE SLEEP APNEA (ADULT) (PEDIATRIC): ICD-10-CM

## 2024-11-19 DIAGNOSIS — Z12.11 ENCOUNTER FOR SCREENING FOR MALIGNANT NEOPLASM OF COLON: ICD-10-CM

## 2024-11-19 DIAGNOSIS — Z87.710 PERSONAL HISTORY OF (CORRECTED) HYPOSPADIAS: Chronic | ICD-10-CM

## 2024-11-19 DIAGNOSIS — Z87.81 PERSONAL HISTORY OF (HEALED) TRAUMATIC FRACTURE: Chronic | ICD-10-CM

## 2024-11-19 NOTE — ASU DISCHARGE PLAN (ADULT/PEDIATRIC) - CARE PROVIDER_API CALL
King Alonso J  Gastroenterology  [address] [place] [address]  Phone: [phone]  Fax: [phone]  Established Patient  Follow Up Time:

## 2024-11-19 NOTE — H&P PST ADULT - NSICDXFAMILYHX_GEN_ALL_CORE_FT
FAMILY HISTORY:  FH: multiple myeloma, father  FH: Parkinson's disease, grandmother  FH: renal failure, mother

## 2024-11-19 NOTE — ASU DISCHARGE PLAN (ADULT/PEDIATRIC) - NS MD DC FALL RISK RISK
For information on Fall & Injury Prevention, visit: [email or web address] OR  [email or web address] OR  https://www.cdc.gov/steadi/patient.html

## 2024-11-19 NOTE — ASU PATIENT PROFILE, ADULT - AS SC BRADEN MOBILITY
Gabirella presents for colorectal cancer screening.  She recently had a positive Cologuard.  She denies any rectal bleeding or noticeable change in her bowel habits.  She denies any GI complaints.  She has not had any anemia on her recent blood work.  We have discussed risks and benefits and she agrees to pursue screening colonoscopy.  MB  (4) no limitation

## 2024-11-19 NOTE — ASU DISCHARGE PLAN (ADULT/PEDIATRIC) - FINANCIAL ASSISTANCE
[facility] provides services at a reduced cost to those who are determined to be eligible through [facility]’s financial assistance program. Information regarding [facility]’s financial assistance program can be found by going to [email or web address] or by calling [phone].

## 2024-11-19 NOTE — ASU PATIENT PROFILE, ADULT - NSICDXPASTMEDICALHX_GEN_ALL_CORE_FT
PAST MEDICAL HISTORY:  H/O: gout     History of cardiac murmur as a child     HTN (hypertension)     Hyperlipidemia     Immature cataract     JAMES on CPAP     Type 2 diabetes mellitus

## 2024-11-25 DIAGNOSIS — K64.9 UNSPECIFIED HEMORRHOIDS: ICD-10-CM

## 2024-11-25 DIAGNOSIS — E78.5 HYPERLIPIDEMIA, UNSPECIFIED: ICD-10-CM

## 2024-11-25 DIAGNOSIS — E11.9 TYPE 2 DIABETES MELLITUS WITHOUT COMPLICATIONS: ICD-10-CM

## 2024-11-25 DIAGNOSIS — I10 ESSENTIAL (PRIMARY) HYPERTENSION: ICD-10-CM

## 2024-11-25 DIAGNOSIS — G47.33 OBSTRUCTIVE SLEEP APNEA (ADULT) (PEDIATRIC): ICD-10-CM

## 2024-11-25 DIAGNOSIS — Z79.84 LONG TERM (CURRENT) USE OF ORAL HYPOGLYCEMIC DRUGS: ICD-10-CM

## 2024-11-25 DIAGNOSIS — Z12.11 ENCOUNTER FOR SCREENING FOR MALIGNANT NEOPLASM OF COLON: ICD-10-CM

## 2024-12-28 ENCOUNTER — INPATIENT (INPATIENT)
Facility: HOSPITAL | Age: 54
LOS: 1 days | Discharge: HOME | DRG: 389 | End: 2024-12-30
Attending: INTERNAL MEDICINE | Admitting: STUDENT IN AN ORGANIZED HEALTH CARE EDUCATION/TRAINING PROGRAM
Payer: COMMERCIAL

## 2024-12-28 VITALS
RESPIRATION RATE: 18 BRPM | HEIGHT: 74 IN | WEIGHT: 304.9 LBS | SYSTOLIC BLOOD PRESSURE: 144 MMHG | HEART RATE: 65 BPM | TEMPERATURE: 99 F | OXYGEN SATURATION: 100 % | DIASTOLIC BLOOD PRESSURE: 76 MMHG

## 2024-12-28 DIAGNOSIS — K56.7 ILEUS, UNSPECIFIED: ICD-10-CM

## 2024-12-28 DIAGNOSIS — Z98.890 OTHER SPECIFIED POSTPROCEDURAL STATES: Chronic | ICD-10-CM

## 2024-12-28 DIAGNOSIS — Z87.81 PERSONAL HISTORY OF (HEALED) TRAUMATIC FRACTURE: Chronic | ICD-10-CM

## 2024-12-28 DIAGNOSIS — Z87.710 PERSONAL HISTORY OF (CORRECTED) HYPOSPADIAS: Chronic | ICD-10-CM

## 2024-12-28 PROBLEM — Z87.39 PERSONAL HISTORY OF OTHER DISEASES OF THE MUSCULOSKELETAL SYSTEM AND CONNECTIVE TISSUE: Chronic | Status: ACTIVE | Noted: 2024-11-19

## 2024-12-28 LAB
ALBUMIN SERPL ELPH-MCNC: 4.4 G/DL — SIGNIFICANT CHANGE UP (ref 3.5–5.2)
ALP SERPL-CCNC: 57 U/L — SIGNIFICANT CHANGE UP (ref 30–115)
ALT FLD-CCNC: 24 U/L — SIGNIFICANT CHANGE UP (ref 0–41)
ANION GAP SERPL CALC-SCNC: 14 MMOL/L — SIGNIFICANT CHANGE UP (ref 7–14)
AST SERPL-CCNC: 25 U/L — SIGNIFICANT CHANGE UP (ref 0–41)
BASOPHILS # BLD AUTO: 0.06 K/UL — SIGNIFICANT CHANGE UP (ref 0–0.2)
BASOPHILS NFR BLD AUTO: 0.4 % — SIGNIFICANT CHANGE UP (ref 0–1)
BILIRUB SERPL-MCNC: 0.4 MG/DL — SIGNIFICANT CHANGE UP (ref 0.2–1.2)
BUN SERPL-MCNC: 22 MG/DL — HIGH (ref 10–20)
CALCIUM SERPL-MCNC: 10.2 MG/DL — SIGNIFICANT CHANGE UP (ref 8.4–10.4)
CHLORIDE SERPL-SCNC: 98 MMOL/L — SIGNIFICANT CHANGE UP (ref 98–110)
CO2 SERPL-SCNC: 26 MMOL/L — SIGNIFICANT CHANGE UP (ref 17–32)
CREAT SERPL-MCNC: 1.2 MG/DL — SIGNIFICANT CHANGE UP (ref 0.7–1.5)
EGFR: 71 ML/MIN/1.73M2 — SIGNIFICANT CHANGE UP
EOSINOPHIL # BLD AUTO: 0.01 K/UL — SIGNIFICANT CHANGE UP (ref 0–0.7)
EOSINOPHIL NFR BLD AUTO: 0.1 % — SIGNIFICANT CHANGE UP (ref 0–8)
GLUCOSE SERPL-MCNC: 147 MG/DL — HIGH (ref 70–99)
HCT VFR BLD CALC: 51 % — SIGNIFICANT CHANGE UP (ref 42–52)
HGB BLD-MCNC: 16.9 G/DL — SIGNIFICANT CHANGE UP (ref 14–18)
IMM GRANULOCYTES NFR BLD AUTO: 0.7 % — HIGH (ref 0.1–0.3)
LACTATE SERPL-SCNC: 2.3 MMOL/L — HIGH (ref 0.7–2)
LACTATE SERPL-SCNC: 2.9 MMOL/L — HIGH (ref 0.7–2)
LACTATE SERPL-SCNC: 4.6 MMOL/L — CRITICAL HIGH (ref 0.7–2)
LIDOCAIN IGE QN: 34 U/L — SIGNIFICANT CHANGE UP (ref 7–60)
LYMPHOCYTES # BLD AUTO: 1 K/UL — LOW (ref 1.2–3.4)
LYMPHOCYTES # BLD AUTO: 6.5 % — LOW (ref 20.5–51.1)
MAGNESIUM SERPL-MCNC: 1.4 MG/DL — LOW (ref 1.8–2.4)
MCHC RBC-ENTMCNC: 29.2 PG — SIGNIFICANT CHANGE UP (ref 27–31)
MCHC RBC-ENTMCNC: 33.1 G/DL — SIGNIFICANT CHANGE UP (ref 32–37)
MCV RBC AUTO: 88.1 FL — SIGNIFICANT CHANGE UP (ref 80–94)
MONOCYTES # BLD AUTO: 0.52 K/UL — SIGNIFICANT CHANGE UP (ref 0.1–0.6)
MONOCYTES NFR BLD AUTO: 3.4 % — SIGNIFICANT CHANGE UP (ref 1.7–9.3)
NEUTROPHILS # BLD AUTO: 13.73 K/UL — HIGH (ref 1.4–6.5)
NEUTROPHILS NFR BLD AUTO: 88.9 % — HIGH (ref 42.2–75.2)
NRBC # BLD: 0 /100 WBCS — SIGNIFICANT CHANGE UP (ref 0–0)
PLATELET # BLD AUTO: 302 K/UL — SIGNIFICANT CHANGE UP (ref 130–400)
PMV BLD: 10.4 FL — SIGNIFICANT CHANGE UP (ref 7.4–10.4)
POTASSIUM SERPL-MCNC: 4.8 MMOL/L — SIGNIFICANT CHANGE UP (ref 3.5–5)
POTASSIUM SERPL-SCNC: 4.8 MMOL/L — SIGNIFICANT CHANGE UP (ref 3.5–5)
PROT SERPL-MCNC: 7.3 G/DL — SIGNIFICANT CHANGE UP (ref 6–8)
RBC # BLD: 5.79 M/UL — SIGNIFICANT CHANGE UP (ref 4.7–6.1)
RBC # FLD: 14.6 % — HIGH (ref 11.5–14.5)
SODIUM SERPL-SCNC: 138 MMOL/L — SIGNIFICANT CHANGE UP (ref 135–146)
WBC # BLD: 15.43 K/UL — HIGH (ref 4.8–10.8)
WBC # FLD AUTO: 15.43 K/UL — HIGH (ref 4.8–10.8)

## 2024-12-28 PROCEDURE — 74177 CT ABD & PELVIS W/CONTRAST: CPT | Mod: 26,MC

## 2024-12-28 PROCEDURE — 82962 GLUCOSE BLOOD TEST: CPT

## 2024-12-28 PROCEDURE — 99285 EMERGENCY DEPT VISIT HI MDM: CPT

## 2024-12-28 PROCEDURE — 83605 ASSAY OF LACTIC ACID: CPT

## 2024-12-28 PROCEDURE — 83735 ASSAY OF MAGNESIUM: CPT

## 2024-12-28 PROCEDURE — 36415 COLL VENOUS BLD VENIPUNCTURE: CPT

## 2024-12-28 PROCEDURE — 99223 1ST HOSP IP/OBS HIGH 75: CPT

## 2024-12-28 PROCEDURE — 83036 HEMOGLOBIN GLYCOSYLATED A1C: CPT

## 2024-12-28 PROCEDURE — G0378: CPT

## 2024-12-28 PROCEDURE — 85025 COMPLETE CBC W/AUTO DIFF WBC: CPT

## 2024-12-28 PROCEDURE — 80053 COMPREHEN METABOLIC PANEL: CPT

## 2024-12-28 RX ORDER — SITAGLIPTIN 50 MG/1
1 TABLET ORAL
Refills: 0 | DISCHARGE

## 2024-12-28 RX ORDER — INSULIN LISPRO 100/ML
VIAL (ML) SUBCUTANEOUS
Refills: 0 | Status: DISCONTINUED | OUTPATIENT
Start: 2024-12-28 | End: 2024-12-29

## 2024-12-28 RX ORDER — PANTOPRAZOLE 40 MG/1
40 TABLET, DELAYED RELEASE ORAL ONCE
Refills: 0 | Status: COMPLETED | OUTPATIENT
Start: 2024-12-28 | End: 2024-12-28

## 2024-12-28 RX ORDER — ATORVASTATIN CALCIUM 40 MG/1
80 TABLET, FILM COATED ORAL AT BEDTIME
Refills: 0 | Status: DISCONTINUED | OUTPATIENT
Start: 2024-12-28 | End: 2024-12-30

## 2024-12-28 RX ORDER — ACETAMINOPHEN 80 MG/.8ML
650 SOLUTION/ DROPS ORAL ONCE
Refills: 0 | Status: COMPLETED | OUTPATIENT
Start: 2024-12-28 | End: 2024-12-28

## 2024-12-28 RX ORDER — INSULIN LISPRO 100/ML
VIAL (ML) SUBCUTANEOUS
Refills: 0 | Status: DISCONTINUED | OUTPATIENT
Start: 2024-12-28 | End: 2024-12-30

## 2024-12-28 RX ORDER — SODIUM CHLORIDE 9 MG/ML
1000 INJECTION, SOLUTION INTRAVENOUS
Refills: 0 | Status: DISCONTINUED | OUTPATIENT
Start: 2024-12-28 | End: 2024-12-30

## 2024-12-28 RX ORDER — ALLOPURINOL 100 MG/1
300 TABLET ORAL DAILY
Refills: 0 | Status: DISCONTINUED | OUTPATIENT
Start: 2024-12-28 | End: 2024-12-30

## 2024-12-28 RX ORDER — MAGNESIUM SULFATE 500 MG/ML
2 INJECTION, SOLUTION INTRAMUSCULAR; INTRAVENOUS
Refills: 0 | Status: COMPLETED | OUTPATIENT
Start: 2024-12-28 | End: 2024-12-29

## 2024-12-28 RX ORDER — DEXTROSE MONOHYDRATE 25 G/50ML
12.5 INJECTION, SOLUTION INTRAVENOUS ONCE
Refills: 0 | Status: DISCONTINUED | OUTPATIENT
Start: 2024-12-28 | End: 2024-12-30

## 2024-12-28 RX ORDER — LEVOTHYROXINE SODIUM 175 UG/1
1 TABLET ORAL
Refills: 0 | DISCHARGE

## 2024-12-28 RX ORDER — DEXTROSE MONOHYDRATE 25 G/50ML
25 INJECTION, SOLUTION INTRAVENOUS ONCE
Refills: 0 | Status: DISCONTINUED | OUTPATIENT
Start: 2024-12-28 | End: 2024-12-30

## 2024-12-28 RX ORDER — GLIPIZIDE 10 MG
1 TABLET ORAL
Refills: 0 | DISCHARGE

## 2024-12-28 RX ORDER — GLUCAGON INJECTION, SOLUTION 0.5 MG/.1ML
1 INJECTION, SOLUTION SUBCUTANEOUS ONCE
Refills: 0 | Status: DISCONTINUED | OUTPATIENT
Start: 2024-12-28 | End: 2024-12-30

## 2024-12-28 RX ORDER — SODIUM CHLORIDE 9 MG/ML
1000 INJECTION, SOLUTION INTRAVENOUS ONCE
Refills: 0 | Status: COMPLETED | OUTPATIENT
Start: 2024-12-28 | End: 2024-12-28

## 2024-12-28 RX ORDER — SODIUM CHLORIDE 9 MG/ML
1000 INJECTION, SOLUTION INTRAVENOUS
Refills: 0 | Status: DISCONTINUED | OUTPATIENT
Start: 2024-12-28 | End: 2024-12-29

## 2024-12-28 RX ORDER — HEPARIN SODIUM 1000 [USP'U]/ML
5000 INJECTION, SOLUTION INTRAVENOUS; SUBCUTANEOUS EVERY 12 HOURS
Refills: 0 | Status: DISCONTINUED | OUTPATIENT
Start: 2024-12-28 | End: 2024-12-30

## 2024-12-28 RX ORDER — DEXTROSE MONOHYDRATE 25 G/50ML
15 INJECTION, SOLUTION INTRAVENOUS ONCE
Refills: 0 | Status: DISCONTINUED | OUTPATIENT
Start: 2024-12-28 | End: 2024-12-30

## 2024-12-28 RX ORDER — ACETAMINOPHEN 80 MG/.8ML
650 SOLUTION/ DROPS ORAL EVERY 6 HOURS
Refills: 0 | Status: DISCONTINUED | OUTPATIENT
Start: 2024-12-28 | End: 2024-12-30

## 2024-12-28 RX ORDER — SODIUM CHLORIDE 9 MG/ML
2000 INJECTION, SOLUTION INTRAVENOUS ONCE
Refills: 0 | Status: COMPLETED | OUTPATIENT
Start: 2024-12-28 | End: 2024-12-28

## 2024-12-28 RX ORDER — ONDANSETRON 4 MG/1
8 TABLET ORAL EVERY 6 HOURS
Refills: 0 | Status: DISCONTINUED | OUTPATIENT
Start: 2024-12-28 | End: 2024-12-30

## 2024-12-28 RX ORDER — PANTOPRAZOLE 40 MG/1
40 TABLET, DELAYED RELEASE ORAL
Refills: 0 | Status: DISCONTINUED | OUTPATIENT
Start: 2024-12-28 | End: 2024-12-30

## 2024-12-28 RX ORDER — ONDANSETRON 4 MG/1
4 TABLET ORAL ONCE
Refills: 0 | Status: COMPLETED | OUTPATIENT
Start: 2024-12-28 | End: 2024-12-28

## 2024-12-28 RX ORDER — LEVOTHYROXINE SODIUM 175 UG/1
50 TABLET ORAL DAILY
Refills: 0 | Status: DISCONTINUED | OUTPATIENT
Start: 2024-12-28 | End: 2024-12-30

## 2024-12-28 RX ORDER — ASPIRIN 81 MG
81 TABLET, DELAYED RELEASE (ENTERIC COATED) ORAL DAILY
Refills: 0 | Status: DISCONTINUED | OUTPATIENT
Start: 2024-12-28 | End: 2024-12-30

## 2024-12-28 RX ADMIN — PANTOPRAZOLE 40 MILLIGRAM(S): 40 TABLET, DELAYED RELEASE ORAL at 19:44

## 2024-12-28 RX ADMIN — SODIUM CHLORIDE 2000 MILLILITER(S): 9 INJECTION, SOLUTION INTRAVENOUS at 15:44

## 2024-12-28 RX ADMIN — ONDANSETRON 4 MILLIGRAM(S): 4 TABLET ORAL at 18:32

## 2024-12-28 RX ADMIN — SODIUM CHLORIDE 1000 MILLILITER(S): 9 INJECTION, SOLUTION INTRAVENOUS at 18:32

## 2024-12-28 RX ADMIN — SODIUM CHLORIDE 2000 MILLILITER(S): 9 INJECTION, SOLUTION INTRAVENOUS at 14:04

## 2024-12-28 RX ADMIN — MAGNESIUM SULFATE 25 GRAM(S): 500 INJECTION, SOLUTION INTRAMUSCULAR; INTRAVENOUS at 21:58

## 2024-12-28 NOTE — ED PROVIDER NOTE - ATTENDING CONTRIBUTION TO CARE
54-year-old male with past medical history of diabetes, high blood pressure, hyperlipidemia, gout, obstructive sleep apnea, colonoscopy in November 2020 for for which he was told he has hemorrhoids presents with diffuse abdominal pain worse to the periumbilical and right side of the abdomen that started at 2 AM, constant, throbbing, mild intensity, nonradiating, no alleviating or precipitating factors associated with chills and approximately 6-7 episodes of nonbilious nonbloody vomiting.  Patient states he went to an urgent care and was given Zofran ODT 8mg  which helped with the nausea.  Was told to come to emergency room for further evaluation.  denies fever, sob, pleuritic chest pain, palpitations, diaphoresis, cough, diarrhea, constipation, melena/brbpr, urinary symptoms, back/ flank pain, penile pain/discharge, testicular pain/swelling/erythema, sick contacts, recent travel or rash.      on exam: non toxic appearing pt sitting on stretcher in nad, no rash, mmm, regular rate, radial pulses 2/4 b/l, no jvd, ctabl w/ breath sounds present b/l, no wheezing or crackles, no accessory muscle use, no tachypnea, no stridor, bs present throughout all 4 quadrants, abd soft, nd, tenderness to palpation to abdomen diffusely worse to periumbilical and rlq, no rebound tenderness or guarding, no cvat, (-) Rovsing (-) Obturator (-) Psaos. (-) Delgadillo's, FROM of ext, no edema, no calf pain/swelling/erythema, AAOx3. No focal deficits.    Plan: Labs, ivf, pain control, imaging, reassess.

## 2024-12-28 NOTE — ED PROVIDER NOTE - PROGRESS NOTE DETAILS
ED Attending YOHAN Edmondson  Pt endorsed to Dr. Langley, please follow up surgery consult, reassess. surgery aware of pt. ED Attending YOHAN Edmondson  Surgery evaluated patient, recommend medical admission for GI follow-up, patient aware, agrees with plan.  Medical meeting team aware of patient admission. GI made aware of pt. ALBERTO-- pt had another episode of vomiting, requesting more nausea medication. Discussed with pt plan for admission

## 2024-12-28 NOTE — ED PROVIDER NOTE - OBJECTIVE STATEMENT
54yoM PMHx DM, HTN, HLD presenting for right lower and periumbilical abdominal pain, nausea, vomiting, and constipation x2 days. Pt was seen initially at urgent care, where urinalysis and viral swab for covid/rsv/flu was negative, and pt was referred to the ED for evaluation. Pt reports the pain has been 54yoM PMHx DM, HTN, HLD presenting for right lower and periumbilical abdominal pain, nausea, chills, vomiting, and constipation x2 days. Pt was seen initially at urgent care, where urinalysis and viral swab for covid/rsv/flu was negative, and pt was referred to the ED for evaluation. Pt reports the pain has been constant, and he has not been able to hold down fluids despite getting two doses of zofran 4mg prior to arrival in the ED. Denies chest pain, sob, dark or bloody stools

## 2024-12-28 NOTE — CONSULT NOTE ADULT - SUBJECTIVE AND OBJECTIVE BOX
GENERAL SURGERY CONSULT NOTE    Patient: ANA ACOSTA , 54y ([birth year])Male   MRN: [id number]  Location: [facility] ED  Visit: 12-28-24 Emergency  Date: 12-28-24 @ 18:31    HPI: Patient is a 55 y/o M with a past medical history of DM, HLD, and hypothyroidism who presents to the ED with chief complaint of 2 days of abdominal discomfort. Patient states that starting 6AM 12/17 he began to experience worsening diffuse abdominal pain, as well as multiple episodes of emesis. Patient endorses passing gas today, and last BM was yesterday, and states that he does not feel more distended than usual.  Patient states that he has not had any subjective fevers at home, but endorses intermittent chills. Patient states that discomfort is worsened with moving or eating. Patient states that other tenens in his building have also recently experienced similar symptoms. Patient denies fevers, chest pain, SOB, palpitations changes in bowel movement, dizziness weakness.       PAST MEDICAL & SURGICAL HISTORY:  HTN (hypertension)  Type 2 diabetes mellitus  Hyperlipidemia  History of cardiac murmur as a child  Immature cataract  JAMES on CPAP  H/O: gout  S/P correction of deviated nasal septum  1980's  H/O fracture of nose  1986  H/O hypospadias  x3-1970's      Home Medications:  allopurinol 300 mg oral tablet: 1 tab(s) orally once a day (19 Nov 2024 10:56)  aspirin 81 mg oral tablet: 1 tab(s) orally once a day LD 2/21/2020 (19 Nov 2024 10:56)  atorvastatin 80 mg oral tablet: 1 tab(s) orally once a day (19 Nov 2024 10:56)  fenofibric acid 135 mg oral delayed release capsule: 1 cap(s) orally once a day (19 Nov 2024 10:56)  Glucosamine &amp; Chondroitin with MSM oral tablet: 1 tab(s) orally once a day LD 2/21/2020 (19 Nov 2024 10:56)  Multiple Vitamins oral tablet: 1 tab(s) orally once a day LD 2/21/2020 (19 Nov 2024 10:56)  pioglitazone 30 mg oral tablet: 1 tab(s) orally once a day (19 Nov 2024 10:56)  Tradjenta 5 mg oral tablet: 1 tab(s) orally once a day (19 Nov 2024 10:56)  valsartan 80 mg oral tablet: 1 tab(s) orally once a day (19 Nov 2024 10:56)        VITALS:  T(F): 97.5 (12-28-24 @ 16:07), Max: 99 (12-28-24 @ 13:11)  HR: 66 (12-28-24 @ 16:07) (65 - 66)  BP: 163/78 (12-28-24 @ 16:07) (144/76 - 163/78)  RR: 18 (12-28-24 @ 16:07) (18 - 18)  SpO2: 97% (12-28-24 @ 16:07) (97% - 100%)    PHYSICAL EXAM:  General: NAD, calm and cooperative  HEENT: NCAT  Cardiac: Extremities well perfused  Respiratory: Normal respiratory effort  Abdomen: Soft, non-distended, non-tender, no rebound, no guarding.  Musculoskeletal: Compartments soft  Neuro: Sensation grossly intact and equal throughout, no focal deficits  Vascular: Pulses 2+ throughout, extremities well perfused      MEDICATIONS  (STANDING):  lactated ringers Bolus 1000 milliLiter(s) IV Bolus once  ondansetron Injectable 4 milliGRAM(s) IV Push once    MEDICATIONS  (PRN):      LAB/STUDIES:                        16.9   15.43 )-----------( 302      ( 28 Dec 2024 14:25 )             51.0     12-28    138  |  98  |  22[H]  ----------------------------<  147[H]  4.8   |  26  |  1.2    Ca    10.2      28 Dec 2024 14:25    TPro  7.3  /  Alb  4.4  /  TBili  0.4  /  DBili  x   /  AST  25  /  ALT  24  /  AlkPhos  57  12-28      LIVER FUNCTIONS - ( 28 Dec 2024 14:25 )  Alb: 4.4 g/dL / Pro: 7.3 g/dL / ALK PHOS: 57 U/L / ALT: 24 U/L / AST: 25 U/L / GGT: x           Urinalysis Basic - ( 28 Dec 2024 14:25 )    Color: x / Appearance: x / SG: x / pH: x  Gluc: 147 mg/dL / Ketone: x  / Bili: x / Urobili: x   Blood: x / Protein: x / Nitrite: x   Leuk Esterase: x / RBC: x / WBC x   Sq Epi: x / Non Sq Epi: x / Bacteria: x        IMAGING:   CT Abdomen and Pelvis w/ IV Cont (12.28.24 @ 16:46)   IMPRESSION:    Gastric antral wall edema. Findings could reflect gastritis. Proximal   small bowel mild dilatation up to 3.6 cm transition to normal caliber in   the left upper quadrant. Findings may reflect ileus. Small bowel   obstruction inherently cannot be completely excluded given the   dilatation. Correlate with clinical suspicion and follow-up as needed.

## 2024-12-28 NOTE — PATIENT PROFILE ADULT - FALL HARM RISK - RISK INTERVENTIONS

## 2024-12-28 NOTE — PATIENT PROFILE ADULT - FALL HARM RISK - FACTORS
Patient ID: Rick is a 70 year old male.    Chief Complaint   Patient presents with    New Patient       HPI  Patient presents for evaluation of a possible right inguinal hernia.  For some time now, he has had intermittent discomfort in his right groin.  This seems to be most pronounced with certain positions and/or strenuous physical activity.  At times, he does seem to notice a small protrusion in his right inguinal region.  No obstructive type symptoms reported.  He has otherwise been in his usual state of health.  Of note, he has recently undergone open heart surgery and in the distant past has had at least 2-3 abdominal surgeries for what sounds to be peptic ulcer disease.    ALLERGIES:   Allergen Reactions    Penicillins RASH     During pre teen years    Ragweed Other (See Comments)     sneezing       Current Outpatient Medications   Medication Sig Dispense Refill    levothyroxine 50 MCG tablet TAKE 1 TABLET BY MOUTH EVERY DAY IN THE MORNING ON AN EMPTY STOMACH      potassium chloride (K-TAB) 20 MEQ ER tablet Take 20 mEq by mouth daily. Take with food      atorvastatin (LIPITOR) 40 MG tablet Take 1 tablet by mouth nightly. 90 tablet 3    furosemide (LASIX) 40 MG tablet Take 1 tablet by mouth daily. Do not start before April 1, 2023. 90 tablet 0    metoPROLOL tartrate (LOPRESSOR) 25 MG tablet Take 1.5 tablets by mouth in the morning and 1.5 tablets in the evening. 270 tablet 1    Omega-3 Fatty Acids (OMEGA-3 FISH OIL PO) Take 1 tablet by mouth 2 (two) times a day.      tamsulosin (FLOMAX) 0.4 MG Cap Take 0.4 mg by mouth daily.      aspirin 81 MG chewable tablet Chew 81 mg by mouth daily.       No current facility-administered medications for this visit.       Past Medical History:   Diagnosis Date    Abnormal stress test     Arthritis     to knees    Borderline diabetes     BPH (benign prostatic hyperplasia)     Cataract     Chest pain     COVID 2020    Diabetes mellitus (CMD)     Gastroesophageal reflux  disease     High cholesterol     History of bleeding peptic ulcer     Thyroid disease     Wears partial dentures     upper and lower    Wears reading eyeglasses        Past Surgical History:   Procedure Laterality Date    Carpal tunnel release Bilateral     release-Konrad    Partial gastrectomy      and vagotomy       Family History   Problem Relation Age of Onset    Patient is unaware of any medical problems Mother         glaucoma    Patient is unaware of any medical problems Father        Social History     Tobacco Use    Smoking status: Former     Current packs/day: 0.00     Average packs/day: 0.5 packs/day for 30.0 years (15.0 ttl pk-yrs)     Types: Cigarettes     Start date:      Quit date:      Years since quittin.0    Smokeless tobacco: Never   Vaping Use    Vaping Use: never used   Substance Use Topics    Alcohol use: Never     Comment: stopped     Drug use: Never       Review of Systems   Constitutional:  Negative for appetite change, fatigue, fever and unexpected weight change.   HENT:  Negative for trouble swallowing.    Eyes:  Negative for redness.   Respiratory:  Negative for cough, chest tightness and shortness of breath.    Cardiovascular:  Negative for chest pain.   Gastrointestinal:  Positive for abdominal pain. Negative for abdominal distention, blood in stool, constipation, nausea and vomiting.   Genitourinary:  Negative for difficulty urinating.   Musculoskeletal:  Negative for back pain.   Skin:  Negative for color change and wound.   Allergic/Immunologic: Negative for environmental allergies.   Neurological:  Negative for dizziness, weakness and light-headedness.   Psychiatric/Behavioral:  Negative for behavioral problems.        Physical Exam  Vitals and nursing note reviewed.   Constitutional:       General: He is not in acute distress.     Appearance: He is well-developed. He is not diaphoretic.   HENT:      Head: Normocephalic.      Nose: Nose normal.      Neck: Normal  range of motion.   Eyes:      General: No scleral icterus.     Conjunctiva/sclera: Conjunctivae normal.      Pupils: Pupils are equal, round, and reactive to light.   Cardiovascular:      Rate and Rhythm: Regular rhythm.   Pulmonary:      Effort: Pulmonary effort is normal. No respiratory distress.      Breath sounds: No stridor.   Abdominal:      General: There is no distension.      Palpations: Abdomen is soft.      Tenderness: There is no abdominal tenderness.      Hernia: No hernia is present.      Comments: No discrete inguinal defects appreciated on today's exam, exam somewhat limited by body habitus    Musculoskeletal:         General: Normal range of motion.   Skin:     General: Skin is warm and dry.      Findings: No rash.   Neurological:      Mental Status: He is alert and oriented to person, place, and time.      Cranial Nerves: No cranial nerve deficit.   Psychiatric:         Behavior: Behavior normal.         ASSESSMENT/PLAN  Problem List Items Addressed This Visit    None  Visit Diagnoses       Right groin pain        Relevant Orders    CT PELVIS WO CONTRAST          Patient presents with a history that seems to be consistent with a symptomatic right inguinal hernia.  However, on exam there are no appreciable discrete inguinal defects.  Therefore, at this time, we will proceed with a noncontrast CT of his pelvis for further evaluation.  The patient was advised that I will contact him personally with the results of that CT once it has been completed.      Bernardino Moore MD , FACS   Other

## 2024-12-28 NOTE — ED PROVIDER NOTE - PHYSICAL EXAMINATION
CONSTITUTIONAL: NAD  SKIN: Warm, dry  HEAD: NCAT  EYES: Clear conjunctiva   ENT: MMM  NECK: Supple  CARD: RRR, S1, S2; no M/R/G  RESP: Normal respiratory effort, CTAB  ABD: Soft, right periumbilical tednereness, No rebound, rigidity, or guarding  EXT: Pulses palpable distally  NEURO: Grossly intact. Awake, alert, moving all extremities, no facial asymmetry.

## 2024-12-28 NOTE — H&P ADULT - ASSESSMENT
#gastritis  #leukocytosis  #elevated lactate   No fever , no source of infection beside gatsritis , no tachycardia        #possible SBO   - No acute surgical intervention, unlikely SBO in the setting of no clinical symptoms consistent with obstruction  - Would reccomend medical admission for further workup of gastritis vs PUD, GI Eval  - Pain control  - Care and Dispo per ED      #diabetes   Will stop   Will start on ISS for now in a setting of decreased PO intake           #HTN           #HLD         - No acute surgical intervention, unlikely SBO in the setting of no clinical symptoms consistent with obstruction  - Would reccomend medical admission for further workup of gastritis vs PUD, GI Eval  - Pain control  - Care and Dispo per ED this is the case of a 54yoM PMHx DM, HTN, HLD, hypothyroidism and GOUT presenting for right lower and periumbilical abdominal pain, nausea, chills, vomiting.   Clinically and HD stable   Not in acute distress       #gastritis  #leukocytosis  #elevated lactate   No fever , no source of infection beside gastritis , no tachycardia  Given 1L of LR in the ED    Will hold ABx for now in the absence of clear bacterial infection    Will start on pantoprazole   Zofran 8 mg Q8 prN for now   NPO since patient is still having nausea       #possible SBO   Surgery REcs appreciated   - No acute surgical intervention, unlikely SBO in the setting of no clinical symptoms consistent with obstruction  - Would recommend medical admission for further workup of gastritis vs PUD, GI Eval  - Pain control  Will consult GI       #diabetes   Will stop ORal antidiabetics   Will start on ISS for now in a setting of decreased PO intake     #HLD  Will continue atorvastatin 80 mg daily   Will HOLD fish oils as inpatient     #GOUT   Will continue allopurinol 300 mg daily      #HTN   Will hold valsartan in a setting of decreased PO intake and vomitting   BP systolic at 144 , Can start CCB in case BP goes higher     #hypothyroidism   Will continue levothyroxine at home dose of 50 mcg

## 2024-12-28 NOTE — H&P ADULT - NSHPLABSRESULTS_GEN_ALL_CORE
16.9   15.43 )-----------( 302      ( 28 Dec 2024 14:25 )             51.0     12-28    138  |  98  |  22[H]  ----------------------------<  147[H]  4.8   |  26  |  1.2    Ca    10.2      28 Dec 2024 14:25    TPro  7.3  /  Alb  4.4  /  TBili  0.4  /  DBili  x   /  AST  25  /  ALT  24  /  AlkPhos  57  12-28          LIVER FUNCTIONS - ( 28 Dec 2024 14:25 )  Alb: 4.4 g/dL / Pro: 7.3 g/dL / ALK PHOS: 57 U/L / ALT: 24 U/L / AST: 25 U/L / GGT: x           Urinalysis Basic - ( 28 Dec 2024 14:25 )    Color: x / Appearance: x / SG: x / pH: x  Gluc: 147 mg/dL / Ketone: x  / Bili: x / Urobili: x   Blood: x / Protein: x / Nitrite: x   Leuk Esterase: x / RBC: x / WBC x   Sq Epi: x / Non Sq Epi: x / Bacteria: x    CT abdomen and pelvis   Gastric antral wall edema. Findings could reflect gastritis. Proximal   small bowel mild dilatation up to 3.6 cm transition to normal caliber in   the left upper quadrant. Findings may reflect ileus. Small bowel   obstruction inherently cannot be completely excluded given the   dilatation. Correlate with clinical suspicion and follow-up as needed.

## 2024-12-28 NOTE — H&P ADULT - HISTORY OF PRESENT ILLNESS
54yoM PMHx DM, HTN, HLD presenting for right lower and periumbilical abdominal pain, nausea, chills, vomiting, and constipation x2 days. Pt was seen initially at urgent care, where urinalysis and viral swab for covid/rsv/flu was negative, and pt was referred to the ED for evaluation. Pt reports the pain has been constant, and he has not been able to hold down fluids despite getting two doses of zofran 4mg prior to arrival in the ED. Denies chest pain, sob, dark or bloody stools.    In the ED vitals :    · BP Systolic  144 mm Hg  · BP Diastolic  76 mm Hg  · Heart Rate  65 /min  · Temp (C)  37.2 Degrees C oral   · SpO2 (%)  100 % on room air          this is the case of a 54yoM PMHx DM, HTN, HLD presenting for right lower and periumbilical abdominal pain, nausea, chills, vomiting, and constipation x2 days. Pt was seen initially at urgent care, where urinalysis and viral swab for covid/rsv/flu was negative, and pt was referred to the ED for evaluation. Pt reports the pain has been constant, and he has not been able to hold down fluids despite getting two doses of zofran 4mg prior to arrival in the ED. Denies chest pain, sob, dark or bloody stools.    In the ED vitals :    · BP Systolic  144 mm Hg  · BP Diastolic  76 mm Hg  · Heart Rate  65 /min  · Temp (C)  37.2 Degrees C oral   · SpO2 (%)  100 % on room air          this is the case of a 54yoM PMHx DM, HTN, HLD, hypothyroidism and GOUT  presenting for right lower and periumbilical abdominal pain, nausea, chills, vomiting. Pt was seen initially at urgent care, where urinalysis and viral swab for covid/rsv/flu was negative, and pt was referred to the ED for evaluation. Pt reports the pain has been constant, and he has not been able to hold down fluids despite getting two doses of zofran 4mg prior to arrival in the ED. Denies chest pain, sob, dark or bloody stools.    In the ED vitals :    · BP Systolic  144 mm Hg  · BP Diastolic  76 mm Hg  · Heart Rate  65 /min  · Temp (C)  37.2 Degrees C oral   · SpO2 (%)  100 % on room air          No

## 2024-12-28 NOTE — ED PROVIDER NOTE - DIFFERENTIAL DIAGNOSIS
The differential diagnosis for patients clinical presentation includes but is not limited to:  metabolic vs infectious etiology  appendicitis  diverticulitis  sbo  perforation Differential Diagnosis

## 2024-12-28 NOTE — ED ADULT TRIAGE NOTE - CHIEF COMPLAINT QUOTE
Pt c/o lower abdominal pain w/ nausea, vomiting. received 8 mg of zofran at [facility]

## 2024-12-28 NOTE — ED PROVIDER NOTE - CLINICAL SUMMARY MEDICAL DECISION MAKING FREE TEXT BOX
54-year-old male with past medical history of diabetes, high blood pressure, hyperlipidemia, gout, obstructive sleep apnea, colonoscopy in November 2020 for for which he was told he has hemorrhoids presents with diffuse abdominal pain worse to the periumbilical and right side of the abdomen that started at 2 AM, constant, throbbing, mild intensity, nonradiating, no alleviating or precipitating factors associated with chills and approximately 6-7 episodes of nonbilious nonbloody vomiting.  Patient states he went to an urgent care and was given Zofran ODT 8mg  which helped with the nausea.  Was told to come to emergency room for further evaluation.  denies fever, sob, pleuritic chest pain, palpitations, diaphoresis, cough, diarrhea, constipation, melena/brbpr, urinary symptoms, back/ flank pain, penile pain/discharge, testicular pain/swelling/erythema, sick contacts, recent travel or rash.      on exam: non toxic appearing pt sitting on stretcher in nad, no rash, mmm, regular rate, radial pulses 2/4 b/l, no jvd, ctabl w/ breath sounds present b/l, no wheezing or crackles, no accessory muscle use, no tachypnea, no stridor, bs present throughout all 4 quadrants, abd soft, nd, tenderness to palpation to abdomen diffusely worse to periumbilical and rlq, no rebound tenderness or guarding, no cvat, (-) Rovsing (-) Obturator (-) Psaos. (-) Delgadillo's, FROM of ext, no edema, no calf pain/swelling/erythema, AAOx3. No focal deficits.    Plan: Labs, ivf, pain control, imaging, reassess.    Labs  were ordered and reviewed.  Imaging was ordered and reviewed by me.  Appropriate medications for patient's presenting complaints were ordered and effects were reassessed.  Patient's records (prior hospital, ED visit) were reviewed. 54-year-old male with past medical history of diabetes, high blood pressure, hyperlipidemia, gout, obstructive sleep apnea, colonoscopy in November 2020 for for which he was told he has hemorrhoids presents with diffuse abdominal pain worse to the periumbilical and right side of the abdomen that started at 2 AM, constant, throbbing, mild intensity, nonradiating, no alleviating or precipitating factors associated with chills and approximately 6-7 episodes of nonbilious nonbloody vomiting.  Patient states he went to an urgent care and was given Zofran ODT 8mg  which helped with the nausea.  Was told to come to emergency room for further evaluation.  denies fever, sob, pleuritic chest pain, palpitations, diaphoresis, cough, diarrhea, constipation, melena/brbpr, urinary symptoms, back/ flank pain, penile pain/discharge, testicular pain/swelling/erythema, sick contacts, recent travel or rash.      on exam: non toxic appearing pt sitting on stretcher in nad, no rash, mmm, regular rate, radial pulses 2/4 b/l, no jvd, ctabl w/ breath sounds present b/l, no wheezing or crackles, no accessory muscle use, no tachypnea, no stridor, bs present throughout all 4 quadrants, abd soft, nd, tenderness to palpation to abdomen diffusely worse to periumbilical and rlq, no rebound tenderness or guarding, no cvat, (-) Rovsing (-) Obturator (-) Psaos. (-) Delgadillo's, FROM of ext, no edema, no calf pain/swelling/erythema, AAOx3. No focal deficits.    Plan: Labs, ivf, pain control, imaging, reassess.    Labs  were ordered and reviewed.  Imaging was ordered and reviewed by me.  Appropriate medications for patient's presenting complaints were ordered and effects were reassessed.  Patient's records (prior hospital, ED visit) were reviewed. Surgery evaluated patient, recommend medical admission for GI follow-up, patient aware, agrees with plan.  Medical meeting team aware of patient admission. GI made aware of pt. Escalation to admission/observation was considered. Patient requires inpatient hospitalization - monitored setting.  I have fully discussed the medical management and delivery of care with the patient. I have discussed any available labs, imaging and treatment options with the patient.  Pt admitted for further care & management.

## 2024-12-28 NOTE — H&P ADULT - NSHPPHYSICALEXAM_GEN_ALL_CORE
PHYSICAL EXAM:      Constitutional:    Eyes:    ENMT:    Neck:    Breasts:    Back:    Respiratory:    Cardiovascular:    Gastrointestinal:    Genitourinary:    Rectal:    Extremities:    Vascular:    Neurological:    Skin:    Lymph Nodes:    Musculoskeletal:    Psychiatric: PHYSICAL EXAM:      Constitutional:does not look in acute distress     Neck:Supple neck    Back:No poi8nt tenderness in the back     Respiratory:GBAE     Cardiovascular:Regular s1s2    Gastrointestinal:Soft non tender non distended abdomen         Extremities: no lower limb edema     Neurological:Alert awake and oriented     Musculoskeletal:Motor  power 5/5 in all 4 extremities

## 2024-12-28 NOTE — ED ADULT NURSE NOTE - OBJECTIVE STATEMENT
pt sent in from [facility] for abd pain since this morning, with N/V. was given zofran at [facility]

## 2024-12-28 NOTE — CONSULT NOTE ADULT - ASSESSMENT
ASSESSMENT:  Patient is a 55 y/o M with a past medical history of DM, HLD, and hypothyroidism who presents to the ED with chief complaint of 2 days of abdominal discomfort. Patient states that starting 6AM 12/17 he began to experience worsening diffuse abdominal pain, as well as multiple episodes of emesis. Patient endorses passing gas today, and last BM was yesterday, and states that he does not feel more distended than usual.  Patient states that he has not had any subjective fevers at home, but endorses intermittent chills. Patient states that discomfort is worsened with moving or eating. Patient states that other tenens in his building have also recently experienced similar symptoms. Patient denies fevers, chest pain, SOB, palpitations changes in bowel movement, dizziness weakness.     General surgery consulted to R/O SBO in the setting of radiographic imaging concerning for SBO and abdominal discomfort. Physical exam findings, imaging, and labs as documented above. CT imaging showing small bowel dilation with transition to normal caliber small bowel. Patient endorses passing gas currently and last bowel movement was yesterday. Low clinical suspicion for small bowel obstruction at this time, radiographic imaging likey 2/2 normal peristaltic movement of bowel. Abd discomfort likely 2/2 gastric wall edema likely 2/2 gastritis vs PUD.    PLAN:  - No acute surgical intervention, unlikely SBO in the setting of no clinical symptoms consistent with obstruction  - Would reccomend medical admission for further workup of gastritis vs PUD, GI Eval  - Pain control  - Care and Dispo per ED      Above plan discussed with Attending Surgeon Dr. Armas  ,patient, patient family, and Primary team  12-28-24 @ 18:31

## 2024-12-28 NOTE — H&P ADULT - ATTENDING COMMENTS
HPI:  this is the case of a 54yoM PMHx DM, HTN, HLD, hypothyroidism and GOUT  presenting for right lower and periumbilical abdominal pain, nausea, chills, vomiting. Pt was seen initially at urgent care, where urinalysis and viral swab for covid/rsv/flu was negative, and pt was referred to the ED for evaluation. Pt reports the pain has been constant, and he has not been able to hold down fluids despite getting two doses of zofran 4mg prior to arrival in the ED. Denies chest pain, sob, dark or bloody stools.    In the ED vitals :    · BP Systolic  144 mm Hg  · BP Diastolic  76 mm Hg  · Heart Rate  65 /min  · Temp (C)  37.2 Degrees C oral   · SpO2 (%)  100 % on room air    (28 Dec 2024 19:36)    REVIEW OF SYSTEMS: see cc/HPI   CONSTITUTIONAL: No weakness, fevers or chills  EYES/ENT: No visual changes;  No vertigo or throat pain   NECK: No pain or stiffness  RESPIRATORY: No cough, wheezing, hemoptysis; No shortness of breath  CARDIOVASCULAR: No chest pain or palpitations  GASTROINTESTINAL: No abdominal or epigastric pain. No nausea, vomiting, or hematemesis; No diarrhea or constipation. No melena or hematochezia.  GENITOURINARY: No dysuria, frequency or hematuria  NEUROLOGICAL: No numbness or weakness  SKIN: No itching, rashes  ENDO: No hyperglycemia, No thyroid disorder, No dyslipidemia   HEM: No bleeding, No easy bruising, No anemia   PSYCHE: No psychosis, No mood disorder No hallucinations No delusion   MSK: No deformity, No fracture, No Joint swelling    Physical Exam:  General: WN/WD NAD  Neurology: A&Ox3, nonfocal, follows commands  Eyes: PERRLA/ EOMI  ENT/Neck: Neck supple, trachea midline, No JVD  Respiratory: CTA B/L, No wheezing, rales, rhonchi  CV: Normal rate regular rhythm, S1S2, no murmurs, rubs or gallops  Abdominal: Soft, NT, ND +BS,   Extremities: No edema, + peripheral pulses  Skin: No Rashes, Hematoma, Ecchymosis    A/p  Abdominal pain / nausea and vomiting - AGE/ Gastritis vs. SBO    NOTE INCOMPLETE HPI:  this is the case of a 54yoM PMHx DM, HTN, HLD, hypothyroidism and GOUT  presenting for right lower and periumbilical abdominal pain, nausea, chills, vomiting. Pt was seen initially at urgent care, where urinalysis and viral swab for covid/rsv/flu was negative, and pt was referred to the ED for evaluation. Pt reports the pain has been constant, and he has not been able to hold down fluids despite getting two doses of zofran 4mg prior to arrival in the ED. Denies chest pain, sob, dark or bloody stools.    In the ED vitals :    · BP Systolic  144 mm Hg  · BP Diastolic  76 mm Hg  · Heart Rate  65 /min  · Temp (C)  37.2 Degrees C oral   · SpO2 (%)  100 % on room air    (28 Dec 2024 19:36)    REVIEW OF SYSTEMS: see cc/HPI   CONSTITUTIONAL: No weakness, fevers or chills  EYES/ENT: No visual changes;  No vertigo or throat pain   NECK: No pain or stiffness  RESPIRATORY: No cough, wheezing, hemoptysis; No shortness of breath  CARDIOVASCULAR: No chest pain or palpitations  GASTROINTESTINAL: (+) abdominal pain. (+) nausea/vomiting, No hematemesis; No diarrhea, No constipation. No melena or hematochezia.  GENITOURINARY: No dysuria, frequency or hematuria  NEUROLOGICAL: No numbness or weakness  SKIN: No itching, rashes  ENDO: No hyperglycemia, No thyroid disorder, No dyslipidemia   HEM: No bleeding, No easy bruising, No anemia   PSYCHE: No psychosis, No mood disorder No hallucinations No delusion   MSK: No deformity, No fracture, No Joint swelling    Physical Exam:  General: WN/WD NAD  Neurology: A&Ox3, nonfocal, follows commands  Eyes: PERRLA/ EOMI  ENT/Neck: Neck supple, trachea midline, No JVD  Respiratory: CTA B/L, No wheezing, rales, rhonchi  CV: Normal rate regular rhythm, S1S2, no murmurs, rubs or gallops  Abdominal: Soft, obese,  NT, ND +BS, non-surgical exam ( although w/ intermittent crampy pain)  Extremities: No edema, + peripheral pulses  Skin: No Rashes, Hematoma, Ecchymosis    A/p  Abdominal pain / nausea and vomiting - AGE/ Gastritis vs. Ileus/ ??SBO  -NPO and IV fluids  -PRN Zofran and PRN pain control   -IV PPI   -GI eval   -Surgical follow up ( no need for intervention at present )  -trial of liquid diet in the AM if no procedure planned and no further vomiting    DM type II - stable blood glucose  -F/s monitoring and ISS while NPO     Dyslipidemia   Hypothyroidism   -statin   -c/ thyroid supplementation     DVT prophylaxis     PATIENT SEEN by ATTENDING 12/28

## 2024-12-29 LAB
ALBUMIN SERPL ELPH-MCNC: 3.9 G/DL — SIGNIFICANT CHANGE UP (ref 3.5–5.2)
ALP SERPL-CCNC: 51 U/L — SIGNIFICANT CHANGE UP (ref 30–115)
ALT FLD-CCNC: 20 U/L — SIGNIFICANT CHANGE UP (ref 0–41)
ANION GAP SERPL CALC-SCNC: 12 MMOL/L — SIGNIFICANT CHANGE UP (ref 7–14)
AST SERPL-CCNC: 19 U/L — SIGNIFICANT CHANGE UP (ref 0–41)
BASOPHILS # BLD AUTO: 0.07 K/UL — SIGNIFICANT CHANGE UP (ref 0–0.2)
BASOPHILS NFR BLD AUTO: 0.7 % — SIGNIFICANT CHANGE UP (ref 0–1)
BILIRUB SERPL-MCNC: 0.5 MG/DL — SIGNIFICANT CHANGE UP (ref 0.2–1.2)
BUN SERPL-MCNC: 16 MG/DL — SIGNIFICANT CHANGE UP (ref 10–20)
CALCIUM SERPL-MCNC: 9.4 MG/DL — SIGNIFICANT CHANGE UP (ref 8.4–10.5)
CHLORIDE SERPL-SCNC: 97 MMOL/L — LOW (ref 98–110)
CO2 SERPL-SCNC: 26 MMOL/L — SIGNIFICANT CHANGE UP (ref 17–32)
CREAT SERPL-MCNC: 1.3 MG/DL — SIGNIFICANT CHANGE UP (ref 0.7–1.5)
EGFR: 64 ML/MIN/1.73M2 — SIGNIFICANT CHANGE UP
EOSINOPHIL # BLD AUTO: 0.12 K/UL — SIGNIFICANT CHANGE UP (ref 0–0.7)
EOSINOPHIL NFR BLD AUTO: 1.1 % — SIGNIFICANT CHANGE UP (ref 0–8)
GLUCOSE BLDC GLUCOMTR-MCNC: 109 MG/DL — HIGH (ref 70–99)
GLUCOSE BLDC GLUCOMTR-MCNC: 118 MG/DL — HIGH (ref 70–99)
GLUCOSE BLDC GLUCOMTR-MCNC: 128 MG/DL — HIGH (ref 70–99)
GLUCOSE BLDC GLUCOMTR-MCNC: 132 MG/DL — HIGH (ref 70–99)
GLUCOSE SERPL-MCNC: 169 MG/DL — HIGH (ref 70–99)
HCT VFR BLD CALC: 47.1 % — SIGNIFICANT CHANGE UP (ref 42–52)
HGB BLD-MCNC: 15.2 G/DL — SIGNIFICANT CHANGE UP (ref 14–18)
IMM GRANULOCYTES NFR BLD AUTO: 0.6 % — HIGH (ref 0.1–0.3)
LACTATE SERPL-SCNC: 1.8 MMOL/L — SIGNIFICANT CHANGE UP (ref 0.7–2)
LYMPHOCYTES # BLD AUTO: 2.38 K/UL — SIGNIFICANT CHANGE UP (ref 1.2–3.4)
LYMPHOCYTES # BLD AUTO: 22.4 % — SIGNIFICANT CHANGE UP (ref 20.5–51.1)
MCHC RBC-ENTMCNC: 28.8 PG — SIGNIFICANT CHANGE UP (ref 27–31)
MCHC RBC-ENTMCNC: 32.3 G/DL — SIGNIFICANT CHANGE UP (ref 32–37)
MCV RBC AUTO: 89.4 FL — SIGNIFICANT CHANGE UP (ref 80–94)
MONOCYTES # BLD AUTO: 0.85 K/UL — HIGH (ref 0.1–0.6)
MONOCYTES NFR BLD AUTO: 8 % — SIGNIFICANT CHANGE UP (ref 1.7–9.3)
NEUTROPHILS # BLD AUTO: 7.13 K/UL — HIGH (ref 1.4–6.5)
NEUTROPHILS NFR BLD AUTO: 67.2 % — SIGNIFICANT CHANGE UP (ref 42.2–75.2)
NRBC # BLD: 0 /100 WBCS — SIGNIFICANT CHANGE UP (ref 0–0)
PLATELET # BLD AUTO: 275 K/UL — SIGNIFICANT CHANGE UP (ref 130–400)
PMV BLD: 10.2 FL — SIGNIFICANT CHANGE UP (ref 7.4–10.4)
POTASSIUM SERPL-MCNC: 4 MMOL/L — SIGNIFICANT CHANGE UP (ref 3.5–5)
POTASSIUM SERPL-SCNC: 4 MMOL/L — SIGNIFICANT CHANGE UP (ref 3.5–5)
PROT SERPL-MCNC: 6.2 G/DL — SIGNIFICANT CHANGE UP (ref 6–8)
RBC # BLD: 5.27 M/UL — SIGNIFICANT CHANGE UP (ref 4.7–6.1)
RBC # FLD: 14.8 % — HIGH (ref 11.5–14.5)
SODIUM SERPL-SCNC: 135 MMOL/L — SIGNIFICANT CHANGE UP (ref 135–146)
WBC # BLD: 10.61 K/UL — SIGNIFICANT CHANGE UP (ref 4.8–10.8)
WBC # FLD AUTO: 10.61 K/UL — SIGNIFICANT CHANGE UP (ref 4.8–10.8)

## 2024-12-29 PROCEDURE — 99232 SBSQ HOSP IP/OBS MODERATE 35: CPT

## 2024-12-29 PROCEDURE — 99223 1ST HOSP IP/OBS HIGH 75: CPT

## 2024-12-29 RX ORDER — SODIUM CHLORIDE 9 MG/ML
1000 INJECTION, SOLUTION INTRAVENOUS
Refills: 0 | Status: DISCONTINUED | OUTPATIENT
Start: 2024-12-29 | End: 2024-12-29

## 2024-12-29 RX ORDER — MAGNESIUM SULFATE 500 MG/ML
2 INJECTION, SOLUTION INTRAMUSCULAR; INTRAVENOUS ONCE
Refills: 0 | Status: COMPLETED | OUTPATIENT
Start: 2024-12-29 | End: 2024-12-29

## 2024-12-29 RX ORDER — SENNOSIDES 8.6 MG/1
2 TABLET, FILM COATED ORAL AT BEDTIME
Refills: 0 | Status: DISCONTINUED | OUTPATIENT
Start: 2024-12-29 | End: 2024-12-30

## 2024-12-29 RX ORDER — POLYETHYLENE GLYCOL 3350 17 G/DOSE
17 POWDER (GRAM) ORAL
Refills: 0 | Status: DISCONTINUED | OUTPATIENT
Start: 2024-12-29 | End: 2024-12-30

## 2024-12-29 RX ADMIN — HEPARIN SODIUM 5000 UNIT(S): 1000 INJECTION, SOLUTION INTRAVENOUS; SUBCUTANEOUS at 05:18

## 2024-12-29 RX ADMIN — LEVOTHYROXINE SODIUM 50 MICROGRAM(S): 175 TABLET ORAL at 06:47

## 2024-12-29 RX ADMIN — ALLOPURINOL 300 MILLIGRAM(S): 100 TABLET ORAL at 11:16

## 2024-12-29 RX ADMIN — ATORVASTATIN CALCIUM 80 MILLIGRAM(S): 40 TABLET, FILM COATED ORAL at 21:16

## 2024-12-29 RX ADMIN — SODIUM CHLORIDE 125 MILLILITER(S): 9 INJECTION, SOLUTION INTRAVENOUS at 08:58

## 2024-12-29 RX ADMIN — PANTOPRAZOLE 40 MILLIGRAM(S): 40 TABLET, DELAYED RELEASE ORAL at 05:18

## 2024-12-29 RX ADMIN — Medication 17 GRAM(S): at 15:20

## 2024-12-29 RX ADMIN — SODIUM CHLORIDE 80 MILLILITER(S): 9 INJECTION, SOLUTION INTRAVENOUS at 01:55

## 2024-12-29 RX ADMIN — MAGNESIUM SULFATE 25 GRAM(S): 500 INJECTION, SOLUTION INTRAMUSCULAR; INTRAVENOUS at 00:02

## 2024-12-29 RX ADMIN — Medication 81 MILLIGRAM(S): at 11:17

## 2024-12-29 RX ADMIN — SENNOSIDES 2 TABLET(S): 8.6 TABLET, FILM COATED ORAL at 21:16

## 2024-12-29 RX ADMIN — MAGNESIUM SULFATE 25 GRAM(S): 500 INJECTION, SOLUTION INTRAMUSCULAR; INTRAVENOUS at 08:57

## 2024-12-29 RX ADMIN — Medication 17 GRAM(S): at 17:37

## 2024-12-29 NOTE — CONSULT NOTE ADULT - ASSESSMENT
Plan   - will need  53 yo M PMHx DM, HTN, HLD, hypothyroidism and gout presenting for right lower and periumbilical abdominal pain, nausea, chills, and vomiting that started day prior to admission. Pt now reporting improvement in his abdominal pain, says his vomiting resolved, no BMs since Thursday.     # Gastric antral wall edema likely 2/2 gastritis   # Proximal small bowel mild dilatation up to 3.6 cm transition to normal caliber in the LUQ likely 2/2 ileus   - Passing gas, last BM was on Thursday   - Abdominal exam benign  - tolerating clear liquid diet, vomiting resolved  - CT AP as above   - Lactate up trending from 2.3 to 4, but clinically pt is improving   - C-scope 11/19/24: -Small Hemorrhoids noted, otherwise normal colon. Appendiceal Orifice Visualized.  - appreciate surgery recs       Plan  - clear liquid diet  - Trend lactate. If lactate not improving, please obtain CT Angio AP   - IV hydration  - Protonix 40 IV BID for now   - avoid hypotension   - Monitor BMS   - GI PCR if any evidence of diarrhea  - Encourage ambulation   - will need EGD as outpatient with Dr Lambert vs inpatient if clinically not improving

## 2024-12-29 NOTE — CONSULT NOTE ADULT - ATTENDING COMMENTS
ACS Attending Note Attestation    Patient is examined and evaluated at the bedside with the residents/PAs. Treatment plan discussed with the team, nurses, and consulting physicians and consulting teams. Medications, radiological studies and all other relevant studies reviewed. I reviewed the resident/PA note and agreed with above assessment and plan with following additions and corrections.    ANA ACOSTA Patient is a 54y old  Male who presents with a chief complaint of abdominal pain  associated with nausea and Vomitting of 1 day duration (28 Dec 2024 19:36)    Physical Exam:  Radiological studies reviewed by me with following noted:  Allergies    No Known Allergies    Intolerances      PAST MEDICAL & SURGICAL HISTORY:  HTN (hypertension)      Type 2 diabetes mellitus      Hyperlipidemia      History of cardiac murmur as a child      Immature cataract      JAMES on CPAP      H/O: gout      S/P correction of deviated nasal septum  1980's      H/O fracture of nose  1986      H/O hypospadias  x3-1970's        Vital Signs Last 24 Hrs  T(C): 36.4 (28 Dec 2024 16:07), Max: 37.2 (28 Dec 2024 13:11)  T(F): 97.5 (28 Dec 2024 16:07), Max: 99 (28 Dec 2024 13:11)  HR: 66 (28 Dec 2024 16:07) (65 - 66)  BP: 163/78 (28 Dec 2024 16:07) (144/76 - 163/78)  BP(mean): --  RR: 18 (28 Dec 2024 16:07) (18 - 18)  SpO2: 97% (28 Dec 2024 16:07) (97% - 100%)                            16.9   15.43 )-----------( 302      ( 28 Dec 2024 14:25 )             51.0     12-28    138  |  98  |  22[H]  ----------------------------<  147[H]  4.8   |  26  |  1.2    Ca    10.2      28 Dec 2024 14:25  Mg     1.4     12-28    TPro  7.3  /  Alb  4.4  /  TBili  0.4  /  DBili  x   /  AST  25  /  ALT  24  /  AlkPhos  57  12-28    SBO  unlikely , possible gastritis/duodenitis PUD.  GI consult for work up.       Shefali Armas MD, FACS  Trauma/ACS/Surgical Critical Care Attending
Patient seen and examined during the GI rounds, case discussed with the GI fellow, plan communicated to the primary team, assessment, recommendations and plan as above.

## 2024-12-30 ENCOUNTER — TRANSCRIPTION ENCOUNTER (OUTPATIENT)
Age: 54
End: 2024-12-30

## 2024-12-30 VITALS
TEMPERATURE: 98 F | SYSTOLIC BLOOD PRESSURE: 134 MMHG | RESPIRATION RATE: 18 BRPM | HEART RATE: 57 BPM | OXYGEN SATURATION: 98 % | DIASTOLIC BLOOD PRESSURE: 77 MMHG

## 2024-12-30 LAB
A1C WITH ESTIMATED AVERAGE GLUCOSE RESULT: 6.3 % — HIGH (ref 4–5.6)
ALBUMIN SERPL ELPH-MCNC: 3.8 G/DL — SIGNIFICANT CHANGE UP (ref 3.5–5.2)
ALP SERPL-CCNC: 52 U/L — SIGNIFICANT CHANGE UP (ref 30–115)
ALT FLD-CCNC: 19 U/L — SIGNIFICANT CHANGE UP (ref 0–41)
ANION GAP SERPL CALC-SCNC: 15 MMOL/L — HIGH (ref 7–14)
AST SERPL-CCNC: 20 U/L — SIGNIFICANT CHANGE UP (ref 0–41)
BASOPHILS # BLD AUTO: 0.07 K/UL — SIGNIFICANT CHANGE UP (ref 0–0.2)
BASOPHILS NFR BLD AUTO: 0.7 % — SIGNIFICANT CHANGE UP (ref 0–1)
BILIRUB SERPL-MCNC: 0.5 MG/DL — SIGNIFICANT CHANGE UP (ref 0.2–1.2)
BUN SERPL-MCNC: 15 MG/DL — SIGNIFICANT CHANGE UP (ref 10–20)
CALCIUM SERPL-MCNC: 9 MG/DL — SIGNIFICANT CHANGE UP (ref 8.4–10.5)
CHLORIDE SERPL-SCNC: 103 MMOL/L — SIGNIFICANT CHANGE UP (ref 98–110)
CO2 SERPL-SCNC: 24 MMOL/L — SIGNIFICANT CHANGE UP (ref 17–32)
CREAT SERPL-MCNC: 1.3 MG/DL — SIGNIFICANT CHANGE UP (ref 0.7–1.5)
EGFR: 64 ML/MIN/1.73M2 — SIGNIFICANT CHANGE UP
EOSINOPHIL # BLD AUTO: 0.19 K/UL — SIGNIFICANT CHANGE UP (ref 0–0.7)
EOSINOPHIL NFR BLD AUTO: 1.9 % — SIGNIFICANT CHANGE UP (ref 0–8)
ESTIMATED AVERAGE GLUCOSE: 134 MG/DL — HIGH (ref 68–114)
GLUCOSE BLDC GLUCOMTR-MCNC: 139 MG/DL — HIGH (ref 70–99)
GLUCOSE BLDC GLUCOMTR-MCNC: 153 MG/DL — HIGH (ref 70–99)
GLUCOSE SERPL-MCNC: 109 MG/DL — HIGH (ref 70–99)
HCT VFR BLD CALC: 46.7 % — SIGNIFICANT CHANGE UP (ref 42–52)
HGB BLD-MCNC: 15.2 G/DL — SIGNIFICANT CHANGE UP (ref 14–18)
IMM GRANULOCYTES NFR BLD AUTO: 0.7 % — HIGH (ref 0.1–0.3)
LYMPHOCYTES # BLD AUTO: 2.69 K/UL — SIGNIFICANT CHANGE UP (ref 1.2–3.4)
LYMPHOCYTES # BLD AUTO: 27.4 % — SIGNIFICANT CHANGE UP (ref 20.5–51.1)
MAGNESIUM SERPL-MCNC: 1.9 MG/DL — SIGNIFICANT CHANGE UP (ref 1.8–2.4)
MCHC RBC-ENTMCNC: 29.3 PG — SIGNIFICANT CHANGE UP (ref 27–31)
MCHC RBC-ENTMCNC: 32.5 G/DL — SIGNIFICANT CHANGE UP (ref 32–37)
MCV RBC AUTO: 90.2 FL — SIGNIFICANT CHANGE UP (ref 80–94)
MONOCYTES # BLD AUTO: 0.88 K/UL — HIGH (ref 0.1–0.6)
MONOCYTES NFR BLD AUTO: 9 % — SIGNIFICANT CHANGE UP (ref 1.7–9.3)
NEUTROPHILS # BLD AUTO: 5.92 K/UL — SIGNIFICANT CHANGE UP (ref 1.4–6.5)
NEUTROPHILS NFR BLD AUTO: 60.3 % — SIGNIFICANT CHANGE UP (ref 42.2–75.2)
NRBC # BLD: 0 /100 WBCS — SIGNIFICANT CHANGE UP (ref 0–0)
PLATELET # BLD AUTO: 251 K/UL — SIGNIFICANT CHANGE UP (ref 130–400)
PMV BLD: 10.4 FL — SIGNIFICANT CHANGE UP (ref 7.4–10.4)
POTASSIUM SERPL-MCNC: 4.8 MMOL/L — SIGNIFICANT CHANGE UP (ref 3.5–5)
POTASSIUM SERPL-SCNC: 4.8 MMOL/L — SIGNIFICANT CHANGE UP (ref 3.5–5)
PROT SERPL-MCNC: 6 G/DL — SIGNIFICANT CHANGE UP (ref 6–8)
RBC # BLD: 5.18 M/UL — SIGNIFICANT CHANGE UP (ref 4.7–6.1)
RBC # FLD: 14.9 % — HIGH (ref 11.5–14.5)
SODIUM SERPL-SCNC: 142 MMOL/L — SIGNIFICANT CHANGE UP (ref 135–146)
WBC # BLD: 9.82 K/UL — SIGNIFICANT CHANGE UP (ref 4.8–10.8)
WBC # FLD AUTO: 9.82 K/UL — SIGNIFICANT CHANGE UP (ref 4.8–10.8)

## 2024-12-30 PROCEDURE — 99239 HOSP IP/OBS DSCHRG MGMT >30: CPT

## 2024-12-30 PROCEDURE — 99233 SBSQ HOSP IP/OBS HIGH 50: CPT

## 2024-12-30 RX ORDER — PANTOPRAZOLE 40 MG/1
1 TABLET, DELAYED RELEASE ORAL
Qty: 45 | Refills: 0
Start: 2024-12-30 | End: 2025-01-28

## 2024-12-30 RX ORDER — PANTOPRAZOLE 40 MG/1
40 TABLET, DELAYED RELEASE ORAL
Refills: 0 | Status: DISCONTINUED | OUTPATIENT
Start: 2024-12-30 | End: 2024-12-30

## 2024-12-30 RX ORDER — POLYETHYLENE GLYCOL 3350 17 G/DOSE
17 POWDER (GRAM) ORAL
Qty: 510 | Refills: 0
Start: 2024-12-30 | End: 2025-01-28

## 2024-12-30 RX ADMIN — ALLOPURINOL 300 MILLIGRAM(S): 100 TABLET ORAL at 11:19

## 2024-12-30 RX ADMIN — Medication 1: at 08:29

## 2024-12-30 RX ADMIN — PANTOPRAZOLE 40 MILLIGRAM(S): 40 TABLET, DELAYED RELEASE ORAL at 05:25

## 2024-12-30 RX ADMIN — Medication 81 MILLIGRAM(S): at 11:19

## 2024-12-30 RX ADMIN — Medication 17 GRAM(S): at 05:26

## 2024-12-30 RX ADMIN — HEPARIN SODIUM 5000 UNIT(S): 1000 INJECTION, SOLUTION INTRAVENOUS; SUBCUTANEOUS at 05:25

## 2024-12-30 RX ADMIN — LEVOTHYROXINE SODIUM 50 MICROGRAM(S): 175 TABLET ORAL at 05:25

## 2024-12-30 NOTE — DISCHARGE NOTE NURSING/CASE MANAGEMENT/SOCIAL WORK - PATIENT PORTAL LINK FT
You can access the FollowMyHealth Patient Portal offered by [facility] by registering at the following website: [email or web address]. By joining [facility]’s FollowMyHealth portal, you will also be able to view your health information using other applications (apps) compatible with our system.

## 2024-12-30 NOTE — PROGRESS NOTE ADULT - ASSESSMENT
53 yo M PMHx DM, HTN, HLD, hypothyroidism and gout presenting for right lower and periumbilical abdominal pain, nausea, chills, and vomiting that started day prior to admission. Pt now reporting improvement in his abdominal pain, says his vomiting resolved, no BMs since Thursday.     # Gastric antral wall edema likely 2/2 gastritis   # Proximal small bowel mild dilatation up to 3.6 cm transition to normal caliber in the LUQ likely 2/2 ileus   - Passing gas, last BM was on Thursday   - Abdominal exam benign  - tolerating clear liquid diet, vomiting resolved  - CT AP as above   - Lactate now normal   - C-scope 11/19/24: Small Hemorrhoids noted, otherwise normal colon. Appendiceal Orifice Visualized.  - appreciate surgery recs       Plan  - Advance diet as tolerated   - Protonix 40 PO BID   - avoid hypotension   - Monitor BMS   - GI PCR if any evidence of diarrhea  - Encourage ambulation   - will need EGD as outpatient with Dr Lambert vs inpatient if clinically not improving   
 54yoM PMHx DM, HTN, HLD, hypothyroidism and GOUT presenting for right lower and periumbilical abdominal pain, nausea, chills, vomiting.       # abd pain, nausea and vomiting- improving  # gastritis  # proximal small bowel dialation- r/o ileus  continue PPI  CT abd: Gastric antral wall edema. Findings could reflect gastritis. Proximal small bowel mild dilatation up to 3.6 cm transition to normal caliber in the left upper quadrant. Findings may reflect ileus.  started on CLD; advance to FLD  monitor gi symptoms, bowel movements  start on laxatives  surgery evaluated- no acute intervention  GI following- case discussed; continue PPI; patient needs OP follow up; await recommendation    #leukocytosis-possibly reactive resolved    #elevated lactate-unclear etiology,  improved  s/p IVF    #diabetes   Hold OHA   ISS    #HLP- continue atorvastatin 80 mg daily     #GOUT -continue allopurinol 300 mg daily    #HTN - valsartan on hold  monitor BP    #hypothyroidism - continue levothyroxine at home dose of 50 mcg     DVT px- heparin    Time spent 40 mnts  plan of care d/w patient

## 2024-12-30 NOTE — DISCHARGE NOTE NURSING/CASE MANAGEMENT/SOCIAL WORK - NSDCPEFALRISK_GEN_ALL_CORE
For information on Fall & Injury Prevention, visit: [email or web address] OR  [email or web address] OR  https://www.cdc.gov/steadi/patient.html

## 2024-12-30 NOTE — DISCHARGE NOTE PROVIDER - HOSPITAL COURSE
54-year-old male patient with a PMH of  DM, HTN, HLD, hypothyroidism and GOUT  presenting for right lower and periumbilical abdominal pain, nausea, chills, vomiting. Pt was seen initially at urgent care, where urinalysis and viral swab for covid/rsv/flu was negative, and pt was referred to the ED for evaluation.     In the ED vitals were as follows: /76, HR 65 BPM, temp 37.2 C, SpO2 100% on room air.     Patient underwent CT imagining of his abdomen which demonstrated: Gastric antral wall edema. Findings could reflect gastritis. Proximal small bowel mild dilatation up to 3.6 cm transition to normal caliber in the left upper quadrant. Findings may reflect ileus.     Surgery was consulted to assess for SBO, and their clinical suspicion was low, and they did not recommend any surgical intervention. GI was consulted for gastritis & ileus- they recommended to advance diet, and trend lactate. Patient's lactate resolved to 1.8, and he was able to tolerate diet. Pt was started on PPI BID, and will followed-up as an out-patient with .     Discussion of discharge plan of care, including discharge diagnoses, medication reconciliation, and follow-ups, was conducted with Dr. Martino on 12/30/2024, and discharge was approved.      54-year-old male patient with a PMH of  DM, HTN, HLD, hypothyroidism and GOUT  presenting for right lower and periumbilical abdominal pain, nausea, chills, vomiting. Pt was seen initially at urgent care, where urinalysis and viral swab for covid/rsv/flu was negative, and pt was referred to the ED for evaluation.     In the ED vitals were as follows: /76, HR 65 BPM, temp 37.2 C, SpO2 100% on room air.     Patient underwent CT imagining of his abdomen which demonstrated: Gastric antral wall edema. Findings could reflect gastritis. Proximal small bowel mild dilatation up to 3.6 cm transition to normal caliber in the left upper quadrant. Findings may reflect ileus.     Surgery was consulted to assess for SBO, and their clinical suspicion was low, and they did not recommend any surgical intervention. GI was consulted for gastritis & ileus- they recommended to advance diet, and trend lactate. Patient's lactate resolved to 1.8, and he was able to tolerate diet. Pt was started on PPI BID, and will followed-up as an out-patient with .     Discussion of discharge plan of care, including discharge diagnoses, medication reconciliation, and follow-ups, was conducted with Dr. Martino on 12/30/2024, and discharge was approved.     Diagnosis  # abd pain, nausea and vomiting- improved; possibly due to gastritis  # proximal small bowel dialation- patient had bowel movement; probably ileus - resolved  #leukocytosis-possibly reactive resolved  #elevated lactate-unclear etiology,  improved  cont PPI  follow up with GI for OP endoscopy    #diabetes   #HLP  #GOUT  #HTN   #hypothyroidism  continue on home medications

## 2024-12-30 NOTE — PROGRESS NOTE ADULT - SUBJECTIVE AND OBJECTIVE BOX
Gastroenterology progress note:     Patient is a 54y old  Male who presents with a chief complaint of abdominal pain  associated with nausea and Vomitting of 1 day duration (30 Dec 2024 09:31)       Admitted on: 12-28-24    Interval History:    No acute events overnight. Pt reports improvement in his pain. No BMs yesterday. Tolerating clear liquid diet.         PAST MEDICAL & SURGICAL HISTORY:  HTN (hypertension)  Type 2 diabetes mellitus  Hyperlipidemia  History of cardiac murmur as a child  Immature cataract  JAMES on CPAP  H/O: gout  S/P correction of deviated nasal septum  1980's  H/O fracture of nose  1986  H/O hypospadias  x3-1970's          MEDICATIONS  (STANDING):  allopurinol 300 milliGRAM(s) Oral daily  aspirin  chewable 81 milliGRAM(s) Oral daily  atorvastatin 80 milliGRAM(s) Oral at bedtime  dextrose 5%. 1000 milliLiter(s) (50 mL/Hr) IV Continuous <Continuous>  dextrose 5%. 1000 milliLiter(s) (100 mL/Hr) IV Continuous <Continuous>  dextrose 50% Injectable 25 Gram(s) IV Push once  dextrose 50% Injectable 12.5 Gram(s) IV Push once  dextrose 50% Injectable 25 Gram(s) IV Push once  glucagon  Injectable 1 milliGRAM(s) IntraMuscular once  heparin   Injectable 5000 Unit(s) SubCutaneous every 12 hours  insulin lispro (ADMELOG) corrective regimen sliding scale   SubCutaneous three times a day before meals  levothyroxine 50 MICROGram(s) Oral daily  pantoprazole    Tablet 40 milliGRAM(s) Oral two times a day  polyethylene glycol 3350 17 Gram(s) Oral two times a day  senna 2 Tablet(s) Oral at bedtime    MEDICATIONS  (PRN):  acetaminophen     Tablet .. 650 milliGRAM(s) Oral every 6 hours PRN Mild Pain (1 - 3)  dextrose Oral Gel 15 Gram(s) Oral once PRN Blood Glucose LESS THAN 70 milliGRAM(s)/deciliter  ondansetron Injectable 8 milliGRAM(s) IV Push every 6 hours PRN Nausea and/or Vomiting      Allergies  No Known Allergies      Review of Systems:   Cardiovascular:  No Chest Pain, No Palpitations  Respiratory:  No Cough, No Dyspnea  Gastrointestinal:  As described in HPI  Skin:  No Skin Lesions, No Jaundice  Neuro:  No Syncope, No Dizziness    Physical Examination:  T(C): 36.4 (12-30-24 @ 08:52), Max: 36.7 (12-29-24 @ 23:52)  HR: 53 (12-30-24 @ 08:52) (53 - 64)  BP: 130/82 (12-30-24 @ 08:52) (130/78 - 133/80)  RR: 18 (12-30-24 @ 08:52) (18 - 18)  SpO2: 98% (12-30-24 @ 08:52) (96% - 98%)      12-29-24 @ 07:01  -  12-30-24 @ 07:00  --------------------------------------------------------  IN: 810 mL / OUT: 0 mL / NET: 810 mL        GENERAL: AAOx3, no acute distress.  HEAD:  Atraumatic, Normocephalic  EYES: conjunctiva and sclera clear  NECK: Supple, no JVD or thyromegaly  CHEST/LUNG: Clear to auscultation bilaterally; No wheeze, rhonchi, or rales  HEART: Regular rate and rhythm; normal S1, S2, No murmurs.  ABDOMEN: Soft, nontender, nondistended; Bowel sounds present       Data:                        15.2   9.82  )-----------( 251      ( 30 Dec 2024 05:16 )             46.7     Hgb trend:  15.2  12-30-24 @ 05:16  15.2  12-29-24 @ 11:56  16.9  12-28-24 @ 14:25        12-30    142  |  103  |  15  ----------------------------<  109[H]  4.8   |  24  |  1.3    Ca    9.0      30 Dec 2024 05:16  Mg     1.9     12-30    TPro  6.0  /  Alb  3.8  /  TBili  0.5  /  DBili  x   /  AST  20  /  ALT  19  /  AlkPhos  52  12-30    Liver panel trend:  TBili 0.5   /   AST 20   /   ALT 19   /   AlkP 52   /   Tptn 6.0   /   Alb 3.8    /   DBili --      12-30  TBili 0.5   /   AST 19   /   ALT 20   /   AlkP 51   /   Tptn 6.2   /   Alb 3.9    /   DBili --      12-29  TBili 0.4   /   AST 25   /   ALT 24   /   AlkP 57   /   Tptn 7.3   /   Alb 4.4    /   DBili --      12-28                   
HPI  Patient is a 54y old Male who presents with a chief complaint of abdominal pain  associated with nausea and Vomitting of 1 day duration (28 Dec 2024 19:36)    Currently admitted to medicine with the primary diagnosis of Ileus       Today is hospital day 1d.     INTERVAL HPI / OVERNIGHT EVENTS:  Patient was seen and examined at bedside  Patient Feels better  still has some pain- intermittently - wave like fasion  no n/V now  passing some flaus  last BM 2-3 days before        PAST MEDICAL & SURGICAL HISTORY  HTN (hypertension)    Type 2 diabetes mellitus    Hyperlipidemia    History of cardiac murmur as a child    Immature cataract    JAMES on CPAP    H/O: gout    S/P correction of deviated nasal septum  1980's    H/O fracture of nose  1986    H/O hypospadias  x3-1970's      ALLERGIES  No Known Allergies    MEDICATIONS  STANDING MEDICATIONS  allopurinol 300 milliGRAM(s) Oral daily  aspirin  chewable 81 milliGRAM(s) Oral daily  atorvastatin 80 milliGRAM(s) Oral at bedtime  dextrose 5%. 1000 milliLiter(s) IV Continuous <Continuous>  dextrose 5%. 1000 milliLiter(s) IV Continuous <Continuous>  dextrose 50% Injectable 25 Gram(s) IV Push once  dextrose 50% Injectable 12.5 Gram(s) IV Push once  dextrose 50% Injectable 25 Gram(s) IV Push once  glucagon  Injectable 1 milliGRAM(s) IntraMuscular once  heparin   Injectable 5000 Unit(s) SubCutaneous every 12 hours  insulin lispro (ADMELOG) corrective regimen sliding scale   SubCutaneous three times a day before meals  lactated ringers. 1000 milliLiter(s) IV Continuous <Continuous>  levothyroxine 50 MICROGram(s) Oral daily  pantoprazole    Tablet 40 milliGRAM(s) Oral before breakfast    PRN MEDICATIONS  acetaminophen     Tablet .. 650 milliGRAM(s) Oral every 6 hours PRN  dextrose Oral Gel 15 Gram(s) Oral once PRN  ondansetron Injectable 8 milliGRAM(s) IV Push every 6 hours PRN    VITALS:  T(F): 97.7  HR: 60  BP: 146/81  RR: 17  SpO2: 96%    PHYSICAL EXAM  GEN: no distress, comfortable  PULM: BS heard b/l equal, No wheezing  CVS: S1S2 present, no rubs or gallops  ABD: Soft, non-distended, no guarding; non-tender  EXT: No lower extremity edema  NEURO: A&Ox3, moving all extremities    LABS                        15.2   10.61 )-----------( 275      ( 29 Dec 2024 11:56 )             47.1     12-29    135  |  97[L]  |  16  ----------------------------<  169[H]  4.0   |  26  |  1.3    Ca    9.4      29 Dec 2024 11:56  Mg     1.4     12-28    TPro  6.2  /  Alb  3.9  /  TBili  0.5  /  DBili  x   /  AST  19  /  ALT  20  /  AlkPhos  51  12-29      Urinalysis Basic - ( 29 Dec 2024 11:56 )    Color: x / Appearance: x / SG: x / pH: x  Gluc: 169 mg/dL / Ketone: x  / Bili: x / Urobili: x   Blood: x / Protein: x / Nitrite: x   Leuk Esterase: x / RBC: x / WBC x   Sq Epi: x / Non Sq Epi: x / Bacteria: x        Lactate, Blood: 1.8 mmol/L (12-29-24 @ 11:56)  Lactate, Blood: 4.6 mmol/L *HH* (12-28-24 @ 20:06)  Lactate, Blood: 2.9 mmol/L *H* (12-28-24 @ 16:06)  Lactate, Blood: 2.3 mmol/L *H* (12-28-24 @ 14:25)          RADIOLOGY

## 2024-12-30 NOTE — DISCHARGE NOTE PROVIDER - NSDCMRMEDTOKEN_GEN_ALL_CORE_FT
allopurinol 300 mg oral tablet: 1 tab(s) orally once a day  aspirin 81 mg oral tablet: 1 tab(s) orally once a day  2/21/2020  atorvastatin 80 mg oral tablet: 1 tab(s) orally once a day  Euthyrox 50 mcg (0.05 mg) oral tablet: 1 tab(s) orally once a day  fenofibric acid 135 mg oral delayed release capsule: 1 cap(s) orally once a day  glipiZIDE 5 mg oral tablet: 1 tab(s) orally 2 times a day  Januvia 50 mg oral tablet: 1 tab(s) orally once a day  Multiple Vitamins oral tablet: 1 tab(s) orally once a day  2/21/2020  omega-3 polyunsaturated fatty acids: 2 gram(s) orally 2 times a day  pioglitazone 30 mg oral tablet: 1 tab(s) orally once a day  valsartan 80 mg oral tablet: 1 tab(s) orally once a day   allopurinol 300 mg oral tablet: 1 tab(s) orally once a day  aspirin 81 mg oral tablet: 1 tab(s) orally once a day  2/21/2020  atorvastatin 80 mg oral tablet: 1 tab(s) orally once a day  Euthyrox 50 mcg (0.05 mg) oral tablet: 1 tab(s) orally once a day  fenofibric acid 135 mg oral delayed release capsule: 1 cap(s) orally once a day  glipiZIDE 5 mg oral tablet: 1 tab(s) orally 2 times a day  Januvia 50 mg oral tablet: 1 tab(s) orally once a day  Multiple Vitamins oral tablet: 1 tab(s) orally once a day  2/21/2020  omega-3 polyunsaturated fatty acids: 2 gram(s) orally 2 times a day  pantoprazole 40 mg oral delayed release tablet: 1 tab(s) orally 2 times a day take twice daily for 2 weeks; then take 1 tab  daily  pioglitazone 30 mg oral tablet: 1 tab(s) orally once a day  polyethylene glycol 3350 oral powder for reconstitution: 17 gram(s) orally once a day  valsartan 80 mg oral tablet: 1 tab(s) orally once a day

## 2024-12-30 NOTE — DISCHARGE NOTE NURSING/CASE MANAGEMENT/SOCIAL WORK - FINANCIAL ASSISTANCE
[facility] provides services at a reduced cost to those who are determined to be eligible through [facility]’s financial assistance program. Information regarding [facility]’s financial assistance program can be found by going to [email or web address] or by calling [phone].

## 2024-12-30 NOTE — DISCHARGE NOTE PROVIDER - ATTENDING DISCHARGE PHYSICAL EXAMINATION:
PHYSICAL EXAM    GEN: no distress, comfortable  PULM: normal respiration  ABD: Soft, fatty distended, no guarding; non-tender  NEURO: A&Ox3, moving all extremities

## 2024-12-30 NOTE — DISCHARGE NOTE PROVIDER - NSDCCPCAREPLAN_GEN_ALL_CORE_FT
PRINCIPAL DISCHARGE DIAGNOSIS  Diagnosis: Gastritis  Assessment and Plan of Treatment: You were admitted to the hospital with complaints of abdominal pain. A CT scan of your abdomen revealed gastritis and ileus.   Gastritis is inflammation of the stomach lining, it can happen for various reason including stress, certain medications, infections or irritating food.   Ileus refers to a temporary slowing or stopping of movement in the intestines, which can cause symptoms like bloating, abdominal discomfort, and difficulty passing gas or stool. This can happen due to ilness or medication side effects.   To treat this, we kept you on bowel rest, and started you on proton-pump inhbitors. You are now able to tolerate your diet, and your abdominal discomfort has improved. You are medically clear to be discharged home with out-patient follow-up.     PRINCIPAL DISCHARGE DIAGNOSIS  Diagnosis: Gastritis  Assessment and Plan of Treatment: You were admitted to the hospital with complaints of abdominal pain. A CT scan of your abdomen revealed gastritis and ileus.   Gastritis is inflammation of the stomach lining, it can happen for various reason including stress, certain medications, infections or irritating food.   Ileus refers to a temporary slowing or stopping of movement in the intestines, which can cause symptoms like bloating, abdominal discomfort, and difficulty passing gas or stool. This can happen due to ilness or medication side effects.   To treat this, we kept you on bowel rest, and started you on proton-pump inhbitors. You are now able to tolerate your diet, and your abdominal discomfort has improved. You are medically clear to be discharged home with out-patient follow-up.  you need to follow up with gatroenterology physician for endoscopic evaluation as OP.  contiune medicaitons as prescribed  Follow up with PCP

## 2024-12-30 NOTE — CHART NOTE - NSCHARTNOTEFT_GEN_A_CORE
Consult received for "MST Score = />2." Upon chart review and patient, patient has had intentional weight loss. Patient's current BMI is 39.1.    Patient does not currently meet criteria for Protein Calorie Malnutrition risk per MST. RD remains available for assessment per protocol or as needed.     Romaine Powers, [facility] via Teams

## 2024-12-30 NOTE — PROGRESS NOTE ADULT - REASON FOR ADMISSION
abdominal pain  associated with nausea and Vomitting of 1 day duration
abdominal pain  associated with nausea and Vomitting of 1 day duration

## 2024-12-30 NOTE — DISCHARGE NOTE PROVIDER - CARE PROVIDER_API CALL
King Alonso  Gastroenterology  [address] [place] [address]  Phone: [phone]  Fax: [phone]  Follow Up Time: 1 week    Addy Langley  Internal Medicine  [address] [place] [id number]  Phone: [phone]  Fax: [phone]  Follow Up Time: 1 week

## 2025-01-02 ENCOUNTER — NON-APPOINTMENT (OUTPATIENT)
Age: 55
End: 2025-01-02

## 2025-01-06 DIAGNOSIS — E11.9 TYPE 2 DIABETES MELLITUS WITHOUT COMPLICATIONS: ICD-10-CM

## 2025-01-06 DIAGNOSIS — E87.20 ACIDOSIS, UNSPECIFIED: ICD-10-CM

## 2025-01-06 DIAGNOSIS — Z79.82 LONG TERM (CURRENT) USE OF ASPIRIN: ICD-10-CM

## 2025-01-06 DIAGNOSIS — G47.33 OBSTRUCTIVE SLEEP APNEA (ADULT) (PEDIATRIC): ICD-10-CM

## 2025-01-06 DIAGNOSIS — Z79.890 HORMONE REPLACEMENT THERAPY: ICD-10-CM

## 2025-01-06 DIAGNOSIS — K56.7 ILEUS, UNSPECIFIED: ICD-10-CM

## 2025-01-06 DIAGNOSIS — E03.9 HYPOTHYROIDISM, UNSPECIFIED: ICD-10-CM

## 2025-01-06 DIAGNOSIS — K29.70 GASTRITIS, UNSPECIFIED, WITHOUT BLEEDING: ICD-10-CM

## 2025-01-06 DIAGNOSIS — Z99.89 DEPENDENCE ON OTHER ENABLING MACHINES AND DEVICES: ICD-10-CM

## 2025-01-06 DIAGNOSIS — I10 ESSENTIAL (PRIMARY) HYPERTENSION: ICD-10-CM

## 2025-01-06 DIAGNOSIS — Z79.84 LONG TERM (CURRENT) USE OF ORAL HYPOGLYCEMIC DRUGS: ICD-10-CM

## 2025-01-06 DIAGNOSIS — E78.5 HYPERLIPIDEMIA, UNSPECIFIED: ICD-10-CM

## 2025-01-14 ENCOUNTER — APPOINTMENT (OUTPATIENT)
Age: 55
End: 2025-01-14

## 2025-01-14 ENCOUNTER — OUTPATIENT (OUTPATIENT)
Dept: OUTPATIENT SERVICES | Facility: HOSPITAL | Age: 55
LOS: 1 days | End: 2025-01-14
Payer: COMMERCIAL

## 2025-01-14 DIAGNOSIS — Z98.890 OTHER SPECIFIED POSTPROCEDURAL STATES: Chronic | ICD-10-CM

## 2025-01-14 DIAGNOSIS — Z87.81 PERSONAL HISTORY OF (HEALED) TRAUMATIC FRACTURE: Chronic | ICD-10-CM

## 2025-01-14 DIAGNOSIS — Z87.710 PERSONAL HISTORY OF (CORRECTED) HYPOSPADIAS: Chronic | ICD-10-CM

## 2025-01-14 DIAGNOSIS — E66.9 OBESITY, UNSPECIFIED: ICD-10-CM

## 2025-01-14 PROCEDURE — 97803 MED NUTRITION INDIV SUBSEQ: CPT

## 2025-01-21 DIAGNOSIS — E66.9 OBESITY, UNSPECIFIED: ICD-10-CM

## 2025-04-15 ENCOUNTER — APPOINTMENT (OUTPATIENT)
Age: 55
End: 2025-04-15
